# Patient Record
Sex: FEMALE | Race: WHITE | NOT HISPANIC OR LATINO | Employment: OTHER | URBAN - METROPOLITAN AREA
[De-identification: names, ages, dates, MRNs, and addresses within clinical notes are randomized per-mention and may not be internally consistent; named-entity substitution may affect disease eponyms.]

---

## 2021-10-15 ENCOUNTER — TRANSCRIBE ORDERS (OUTPATIENT)
Dept: ADMINISTRATIVE | Facility: HOSPITAL | Age: 86
End: 2021-10-15

## 2021-10-15 DIAGNOSIS — R06.09 DYSPNEA ON EXERTION: ICD-10-CM

## 2021-10-15 DIAGNOSIS — I10 HTN (HYPERTENSION) WITH GOAL TO BE DETERMINED: Primary | ICD-10-CM

## 2022-11-18 ENCOUNTER — OFFICE VISIT (OUTPATIENT)
Dept: CARDIOLOGY | Facility: CLINIC | Age: 87
End: 2022-11-18

## 2022-11-18 ENCOUNTER — LAB (OUTPATIENT)
Dept: LAB | Facility: HOSPITAL | Age: 87
End: 2022-11-18

## 2022-11-18 VITALS
WEIGHT: 154 LBS | HEART RATE: 70 BPM | DIASTOLIC BLOOD PRESSURE: 64 MMHG | BODY MASS INDEX: 25.66 KG/M2 | SYSTOLIC BLOOD PRESSURE: 117 MMHG | HEIGHT: 65 IN

## 2022-11-18 DIAGNOSIS — R06.09 DYSPNEA ON EXERTION: ICD-10-CM

## 2022-11-18 DIAGNOSIS — R09.89 LABILE HYPERTENSION: ICD-10-CM

## 2022-11-18 DIAGNOSIS — R03.0 ELEVATED BLOOD-PRESSURE READING, WITHOUT DIAGNOSIS OF HYPERTENSION: ICD-10-CM

## 2022-11-18 DIAGNOSIS — R06.09 DYSPNEA ON EXERTION: Primary | ICD-10-CM

## 2022-11-18 LAB
NT-PROBNP SERPL-MCNC: 310 PG/ML (ref 0–1800)
TSH SERPL DL<=0.05 MIU/L-ACNC: 3.12 UIU/ML (ref 0.27–4.2)

## 2022-11-18 PROCEDURE — 99204 OFFICE O/P NEW MOD 45 MIN: CPT | Performed by: INTERNAL MEDICINE

## 2022-11-18 PROCEDURE — 84443 ASSAY THYROID STIM HORMONE: CPT

## 2022-11-18 PROCEDURE — 36415 COLL VENOUS BLD VENIPUNCTURE: CPT

## 2022-11-18 PROCEDURE — 83880 ASSAY OF NATRIURETIC PEPTIDE: CPT

## 2022-11-18 RX ORDER — AMLODIPINE BESYLATE 5 MG/1
2.5 TABLET ORAL DAILY
COMMUNITY
Start: 2022-10-07

## 2022-11-18 RX ORDER — EMPAGLIFLOZIN 10 MG/1
10 TABLET, FILM COATED ORAL DAILY
COMMUNITY
Start: 2022-08-18

## 2022-11-18 RX ORDER — SPIRONOLACTONE 25 MG/1
12.5 TABLET ORAL EVERY MORNING
COMMUNITY
Start: 2022-09-30

## 2022-11-18 RX ORDER — OMEPRAZOLE 20 MG/1
20 CAPSULE, DELAYED RELEASE ORAL DAILY
COMMUNITY
Start: 2022-10-07

## 2022-11-18 RX ORDER — LISINOPRIL 10 MG/1
10 TABLET ORAL DAILY
COMMUNITY
Start: 2022-10-18 | End: 2023-02-21

## 2022-11-18 RX ORDER — ASPIRIN 81 MG/1
81 TABLET ORAL DAILY
COMMUNITY

## 2022-11-18 NOTE — PROGRESS NOTES
Chief Complaint  Congestive Heart Failure    Subjective            Jeanne Dallas presents to [facility] CARDIOLOGY  History of Present Illness    86-year-old white female.  She has no significant cardiac history in the past.  She has hypertension.  She is referred due to increased shortness of breath worse with exertion over the past several months.  The patient denies chest pain or chest pressure.  She denies palpitations.  She measures her blood pressures at home and the numbers she is getting their are normal.  However her blood pressures in the office are quite elevated.  She reports she is compliant with her medications.  She denies significant fluid accumulation.  No syncopal episodes.  She does report weight loss    PMH  Past Medical History:   Diagnosis Date   • Congenital heart disease    • Hypertension          SURGICALHX  Past Surgical History:   Procedure Laterality Date   • CARDIAC CATHETERIZATION          SOC  Social History     Socioeconomic History   • Marital status:    Tobacco Use   • Smoking status: Never   • Smokeless tobacco: Never   Vaping Use   • Vaping Use: Never used   Substance and Sexual Activity   • Alcohol use: Never   • Drug use: Never   • Sexual activity: Defer         FAMHX  Family History   Problem Relation Age of Onset   • Heart attack Mother    • Heart failure Mother    • Stroke Father           ALLERGY  No Known Allergies     MEDSCURRENT    Current Outpatient Medications:   •  amLODIPine (NORVASC) 5 MG tablet, Take 2.5 mg by mouth Daily., Disp: , Rfl:   •  aspirin 81 MG EC tablet, Take 81 mg by mouth Daily., Disp: , Rfl:   •  Jardiance 10 MG tablet tablet, Take 10 mg by mouth Daily., Disp: , Rfl:   •  lisinopril (PRINIVIL,ZESTRIL) 10 MG tablet, Take 10 mg by mouth Daily., Disp: , Rfl:   •  metoprolol tartrate (LOPRESSOR) 25 MG tablet, Take 12.5 mg by mouth 2 (Two) Times a Day., Disp: , Rfl:   •  Multiple Vitamins-Minerals (PRESERVISION AREDS 2 PO),  "Take  by mouth Daily., Disp: , Rfl:   •  omeprazole (priLOSEC) 20 MG capsule, Take 20 mg by mouth Daily., Disp: , Rfl:   •  spironolactone (ALDACTONE) 25 MG tablet, Take 12.5 mg by mouth Every Morning., Disp: , Rfl:       Review of Systems   Constitutional: Positive for malaise/fatigue and weight loss.   HENT: Negative.    Eyes: Negative.    Cardiovascular: Positive for dyspnea on exertion. Negative for chest pain.   Respiratory: Positive for shortness of breath.    Endocrine: Negative.    Hematologic/Lymphatic: Negative.    Skin: Negative.    Musculoskeletal: Negative.    Gastrointestinal: Negative.    Genitourinary: Negative.    Neurological: Negative.    Psychiatric/Behavioral: Negative.         Objective     /64 Comment (BP Location): home this AM  Pulse 70   Ht 165.1 cm (65\")   Wt 69.9 kg (154 lb)   BMI 25.63 kg/m²       General Appearance:   · well developed  · well nourished  HENT:   · oropharynx moist  · lips not cyanotic  Neck:  · thyroid not enlarged  · supple  Respiratory:  · no respiratory distress  · normal breath sounds  · no rales  Cardiovascular:  · no jugular venous distention  · regular rhythm  · apical impulse normal  · S1 normal, S2 normal  · no S3, no S4   · no murmur  · no rub, no thrill  · carotid pulses normal; no bruit  · pedal pulses normal  · lower extremity edema: none    Musculoskeletal:  · no clubbing of fingers.   · normocephalic, head atraumatic  Skin:   · warm, dry  Psychiatric:  · judgement and insight appropriate  · normal mood and affect      Result Review :             Data reviewed: Primary care records reviewed, laboratory studies reviewed, creatinine normal, electrolytes normal.  Echocardiogram reviewed from April which showed ejection fraction 60% with grade 1 diastolic dysfunction and trivial mitral regurgitation EKG reviewed from primary care office showing sinus bradycardia with no acute ST changes.    Procedures               Assessment and Plan  "       ASSESSMENT:  Encounter Diagnoses   Name Primary?   • Dyspnea on exertion Yes   • Labile hypertension    • Elevated blood-pressure reading, without diagnosis of hypertension          PLAN:    1.  Dyspnea on exertion, likely secondary to labile hypertension.  The patient's blood pressure is markedly uncontrolled in the office today initially 190/90, but her blood pressure at home this morning was 117/64.  I am ordering an ambulatory 24-hour blood pressure monitor to get some correlation with her average blood pressure.  Additionally a BNP and TSH will be checked in a 48-hour Holter monitor.  She had a relatively recent echo that was benign.  2.  Labile hypertension, with marked differences between home and office readings.  The 24-hour blood pressure monitor should help sort this out.  3.  We will discuss diagnostic results when available          Patient was given instructions and counseling regarding her condition or for health maintenance advice. Please see specific information pulled into the AVS if appropriate.             IFTIKHAR Yee MD  11/18/2022    10:51 EST

## 2022-11-22 ENCOUNTER — TELEPHONE (OUTPATIENT)
Dept: CARDIOLOGY | Facility: CLINIC | Age: 87
End: 2022-11-22

## 2022-11-22 NOTE — TELEPHONE ENCOUNTER
----- Message from BRYANT Godinez sent at 11/22/2022  9:15 AM EST -----  Labs reviewed, within normal limits.  We will call with Holter monitor results once available.

## 2022-11-30 ENCOUNTER — TELEPHONE (OUTPATIENT)
Dept: CARDIOLOGY | Facility: CLINIC | Age: 87
End: 2022-11-30

## 2022-11-30 NOTE — TELEPHONE ENCOUNTER
----- Message from Page Montague RN sent at 11/30/2022  8:51 AM EST -----    ----- Message -----  From: IFTIKHAR Yee MD  Sent: 11/30/2022   8:46 AM EST  To: Page Montague RN    Heart monitor showed predominantly normal rhythm with occasional single irregular beats.  These were not causing symptoms.  No significant arrhythmias were observed    await ambulatory BP monitor

## 2022-12-22 ENCOUNTER — APPOINTMENT (OUTPATIENT)
Dept: CARDIOLOGY | Facility: HOSPITAL | Age: 87
End: 2022-12-22

## 2023-01-09 ENCOUNTER — OFFICE VISIT (OUTPATIENT)
Dept: PULMONOLOGY | Facility: CLINIC | Age: 88
End: 2023-01-09
Payer: MEDICARE

## 2023-01-09 VITALS
HEART RATE: 63 BPM | OXYGEN SATURATION: 92 % | DIASTOLIC BLOOD PRESSURE: 82 MMHG | RESPIRATION RATE: 18 BRPM | SYSTOLIC BLOOD PRESSURE: 196 MMHG | TEMPERATURE: 97.5 F | WEIGHT: 154 LBS | BODY MASS INDEX: 24.75 KG/M2 | HEIGHT: 66 IN

## 2023-01-09 DIAGNOSIS — R06.09 DOE (DYSPNEA ON EXERTION): Primary | ICD-10-CM

## 2023-01-09 DIAGNOSIS — I51.89 DIASTOLIC DYSFUNCTION: ICD-10-CM

## 2023-01-09 PROCEDURE — 99204 OFFICE O/P NEW MOD 45 MIN: CPT | Performed by: INTERNAL MEDICINE

## 2023-01-09 RX ORDER — ESTRADIOL 0.1 MG/G
CREAM VAGINAL
COMMUNITY
Start: 2022-11-21

## 2023-01-09 NOTE — ASSESSMENT & PLAN NOTE
Suspected the patient shortness of breath likely secondary to diastolic dysfunction    Continue ambulatory blood pressure monitoring    May need up titration of medications will defer this to cardiology

## 2023-01-09 NOTE — ASSESSMENT & PLAN NOTE
At this time I am concerned about diastolic heart failure as cause of the patient's shortness of breath especially given her very elevated blood pressures    Patient however has not had any pulmonary imaging or work-up    At this time we will refrain from starting any new medications    Obtain CT scan of the chest to assess for any underlying interstitial lung disease patient does have what appears to be arthritis he does have some basilar crackles    Obtain full pulmonary function studies

## 2023-01-09 NOTE — PROGRESS NOTES
Chief Complaint  Establish Care (New Patient ), Dyspena , Shortness of Breath, Cough (Sometimes ), and Wheezing (Sometimes )    Subjective        Jeanne Dallas presents to [facility] PULMONARY & CRITICAL CARE MEDICINE  History of Present Illness  Patient here today for evaluation of shortness of breath  Has been having worsening shortness of breath over the course the past year now  Symptoms getting worse  Does have occasional lower extremity edema  Has been seen by cardiology  Cardiology felt that her labile blood pressure was causing her shortness of breath as her blood pressures very close to 200 systolic  Patient with a blood pressure of over 190 systolic here today in the office  Has no significant cough  Has been tried on bronchodilator therapies in the past with no improvement  No significant environmental or smoke exposure at this time  Objective   Vital Signs:  BP (!) 196/82 (BP Location: Left arm, Patient Position: Sitting, Cuff Size: Adult)   Pulse 63   Temp 97.5 °F (36.4 °C) (Temporal)   Resp 18   Ht 167.6 cm (66\")   Wt 69.9 kg (154 lb)   SpO2 92% Comment: room air  BMI 24.86 kg/m²   Estimated body mass index is 24.86 kg/m² as calculated from the following:    Height as of this encounter: 167.6 cm (66\").    Weight as of this encounter: 69.9 kg (154 lb).    BMI is within normal parameters. No other follow-up for BMI required.      Physical Exam vital Signs Reviewed  General WDWN, Alert, NAD.    Chest:  good aeration, clear to auscultation bilaterally, tympanic to percussion bilaterally, no work of breathing noted  CV: RRR, no MGR, .  EXT: Does have ulnar deviation of the fingers  Neuro:  A&Ox3, CN grossly intact, no focal deficits.  Skin: No rashes or lesions noted  Result Review :      Data reviewed: Echocardiogram showing impaired relaxation          Assessment and Plan   Diagnoses and all orders for this visit:    1. MANCINI (dyspnea on exertion) (Primary)  Assessment &  Plan:  At this time I am concerned about diastolic heart failure as cause of the patient's shortness of breath especially given her very elevated blood pressures    Patient however has not had any pulmonary imaging or work-up    At this time we will refrain from starting any new medications    Obtain CT scan of the chest to assess for any underlying interstitial lung disease patient does have what appears to be arthritis he does have some basilar crackles    Obtain full pulmonary function studies    Orders:  -     CT Chest Without Contrast Diagnostic; Future  -     Full Pulmonary Function Test With Bronchodilator; Future    2. Diastolic dysfunction  Assessment & Plan:  Suspected the patient shortness of breath likely secondary to diastolic dysfunction    Continue ambulatory blood pressure monitoring    May need up titration of medications will defer this to cardiology           I spent 40 minutes caring for Jaenne on this date of service. This time includes time spent by me in the following activities:preparing for the visit, reviewing tests, obtaining and/or reviewing a separately obtained history, performing a medically appropriate examination and/or evaluation , counseling and educating the patient/family/caregiver, ordering medications, tests, or procedures, referring and communicating with other health care professionals  and Personally reviewing notes from the patient's cardiology visit, reviewing notes from her echocardiogram, reviewing notes from her primary care office visit, reviewing laboratory data showing a normal TSH and a normal NT BNP  Follow Up   Return in about 6 weeks (around 2/20/2023).  Patient was given instructions and counseling regarding her condition or for health maintenance advice. Please see specific information pulled into the AVS if appropriate.

## 2023-01-18 ENCOUNTER — HOSPITAL ENCOUNTER (OUTPATIENT)
Dept: RESPIRATORY THERAPY | Facility: HOSPITAL | Age: 88
Discharge: HOME | End: 2023-01-18
Admitting: INTERNAL MEDICINE
Payer: MEDICARE

## 2023-01-18 DIAGNOSIS — R06.09 DOE (DYSPNEA ON EXERTION): ICD-10-CM

## 2023-01-18 PROCEDURE — 94060 EVALUATION OF WHEEZING: CPT

## 2023-01-18 PROCEDURE — 94726 PLETHYSMOGRAPHY LUNG VOLUMES: CPT | Performed by: INTERNAL MEDICINE

## 2023-01-18 PROCEDURE — 94640 AIRWAY INHALATION TREATMENT: CPT

## 2023-01-18 PROCEDURE — 94726 PLETHYSMOGRAPHY LUNG VOLUMES: CPT

## 2023-01-18 PROCEDURE — 94729 DIFFUSING CAPACITY: CPT

## 2023-01-18 PROCEDURE — 94060 EVALUATION OF WHEEZING: CPT | Performed by: INTERNAL MEDICINE

## 2023-01-18 PROCEDURE — 94729 DIFFUSING CAPACITY: CPT | Performed by: INTERNAL MEDICINE

## 2023-01-18 RX ORDER — ALBUTEROL SULFATE 2.5 MG/3ML
2.5 SOLUTION RESPIRATORY (INHALATION) ONCE
Status: COMPLETED | OUTPATIENT
Start: 2023-01-18 | End: 2023-01-18

## 2023-01-18 RX ADMIN — ALBUTEROL SULFATE 2.5 MG: 2.5 SOLUTION RESPIRATORY (INHALATION) at 10:57

## 2023-02-16 ENCOUNTER — HOSPITAL ENCOUNTER (OUTPATIENT)
Dept: CARDIOLOGY | Facility: HOSPITAL | Age: 88
Discharge: HOME | End: 2023-02-16
Admitting: INTERNAL MEDICINE
Payer: MEDICARE

## 2023-02-16 DIAGNOSIS — R06.09 DYSPNEA ON EXERTION: ICD-10-CM

## 2023-02-16 DIAGNOSIS — R09.89 LABILE HYPERTENSION: ICD-10-CM

## 2023-02-16 DIAGNOSIS — R03.0 ELEVATED BLOOD-PRESSURE READING, WITHOUT DIAGNOSIS OF HYPERTENSION: ICD-10-CM

## 2023-02-16 PROCEDURE — 93790 AMBL BP MNTR W/SW I&R: CPT | Performed by: INTERNAL MEDICINE

## 2023-02-16 PROCEDURE — 93788 AMBL BP MNTR W/SW A/R: CPT

## 2023-02-21 ENCOUNTER — TELEPHONE (OUTPATIENT)
Dept: CARDIOLOGY | Facility: CLINIC | Age: 88
End: 2023-02-21
Payer: MEDICARE

## 2023-02-21 LAB
BH CV ASLEEP DIP DIA: -19.4
BH CV ASLEEP DIP SYS: -19.8
BH CV ASLEEP MEAN DIA MMHG STD DEV: 9.9
BH CV ASLEEP MEAN HR BPM STD DEV: 3
BH CV ASLEEP MEAN SYS MMHG STD DEV: 16.1
BH CV ASLEEP PERIOD BEGIN TIME: 2000
BH CV ASLEEP PERIOD BP LOAD DIA: 27
BH CV ASLEEP PERIOD BP LOAD SYS: 100
BH CV ASLEEP PERIOD END TIME: 700
BH CV ASLEEP PERIOD MEAN DIA MMHG: 75
BH CV ASLEEP PERIOD MEAN HR BPM: 64
BH CV ASLEEP PERIOD MEAN SYS MMHG: 183
BH CV ASLEEP PERIOD SAMPLES: 11
BH CV AWAKE MEAN DIA MMHG STD DEV: 8.2
BH CV AWAKE MEAN HR BPM STD DEV: 10.4
BH CV AWAKE MEAN SYS MMHG STD DEV: 15.2
BH CV AWAKE PERIOD BEGIN TIME: 700
BH CV AWAKE PERIOD BP LOAD DIA: 0
BH CV AWAKE PERIOD BP LOAD SYS: 80
BH CV AWAKE PERIOD END TIME: 2000
BH CV AWAKE PERIOD MEAN DIA MMHG: 62
BH CV AWAKE PERIOD MEAN HR BPM: 66
BH CV AWAKE PERIOD MEAN SYS MMHG: 153
BH CV AWAKE PERIOD SAMPLES: 25
BH CV MAXIMUM MEAN DIA MMHG: 84
BH CV MAXIMUM MEAN HR BPM: 77
BH CV MAXIMUM MEAN SYS MMHG: 177
BH CV MEAN MEAN DIA MMHG: 81
BH CV MEAN MEAN HR BPM: 77
BH CV MEAN MEAN SYS MMHG: 166
BH CV OVERALL BEGIN TIME: 1257
BH CV OVERALL BP LOAD DIA: 8
BH CV OVERALL BP LOAD SYS: 86
BH CV OVERALL END TIME: 1405
BH CV OVERALL MEAN DIA MMHG STD DEV: 10.3
BH CV OVERALL MEAN DIA MMHG: 66
BH CV OVERALL MEAN HR BPM: 65
BH CV OVERALL MEAN HR STD DEV: 8.8
BH CV OVERALL MEAN SYS MMHG STD DEV: 20.8
BH CV OVERALL MEAN SYS MMHG: 162
BH CV OVERALL SAMPLES: 36
BH CV WHITE COAT PERIOD BP LOAD DIA: 0
BH CV WHITE COAT PERIOD BP LOAD SYS: 100
MAXIMAL PREDICTED HEART RATE: 133 BPM
STRESS TARGET HR: 113 BPM

## 2023-02-21 RX ORDER — LISINOPRIL 10 MG/1
10 TABLET ORAL 2 TIMES DAILY
Qty: 180 TABLET | Refills: 3 | Status: SHIPPED | OUTPATIENT
Start: 2023-02-21

## 2023-02-21 NOTE — TELEPHONE ENCOUNTER
----- Message from IFTIKHAR Yee MD sent at 2/21/2023 12:25 PM EST -----  24-hour blood pressure monitor reviewed, her average blood pressure is too high, there were no low readings.  Average blood pressure was 162/66.  I am increasing her lisinopril to be taken twice a day 10 mg instead of once a day.  Follow-up with Evelyn in the [place] office in 4 months

## 2023-03-01 ENCOUNTER — OFFICE VISIT (OUTPATIENT)
Dept: PULMONOLOGY | Facility: CLINIC | Age: 88
End: 2023-03-01
Payer: MEDICARE

## 2023-03-01 VITALS
TEMPERATURE: 97.7 F | OXYGEN SATURATION: 95 % | BODY MASS INDEX: 22.29 KG/M2 | RESPIRATION RATE: 18 BRPM | SYSTOLIC BLOOD PRESSURE: 185 MMHG | HEART RATE: 61 BPM | WEIGHT: 142 LBS | HEIGHT: 67 IN | DIASTOLIC BLOOD PRESSURE: 81 MMHG

## 2023-03-01 DIAGNOSIS — R09.89 LABILE HYPERTENSION: ICD-10-CM

## 2023-03-01 DIAGNOSIS — R06.09 DYSPNEA ON EXERTION: ICD-10-CM

## 2023-03-01 DIAGNOSIS — I51.89 DIASTOLIC DYSFUNCTION: ICD-10-CM

## 2023-03-01 DIAGNOSIS — J44.9 ASTHMA-COPD OVERLAP SYNDROME: Primary | ICD-10-CM

## 2023-03-01 PROCEDURE — 99214 OFFICE O/P EST MOD 30 MIN: CPT | Performed by: NURSE PRACTITIONER

## 2023-03-01 RX ORDER — ALBUTEROL SULFATE 1.25 MG/3ML
1 SOLUTION RESPIRATORY (INHALATION) EVERY 6 HOURS PRN
COMMUNITY

## 2023-03-01 RX ORDER — FLUTICASONE PROPIONATE AND SALMETEROL XINAFOATE 115; 21 UG/1; UG/1
2 AEROSOL, METERED RESPIRATORY (INHALATION)
Qty: 1 EACH | Refills: 3 | Status: SHIPPED | OUTPATIENT
Start: 2023-03-01

## 2023-03-01 RX ORDER — ALBUTEROL SULFATE 90 UG/1
2 AEROSOL, METERED RESPIRATORY (INHALATION) EVERY 4 HOURS PRN
Qty: 18 G | Refills: 3 | Status: SHIPPED | OUTPATIENT
Start: 2023-03-01

## 2023-03-01 NOTE — PATIENT INSTRUCTIONS
COPD and Physical Activity  Chronic obstructive pulmonary disease (COPD) is a long-term, or chronic, condition that affects the lungs. COPD is a general term that can be used to describe many problems that cause inflammation of the lungs and limit airflow. These conditions include chronic bronchitis and emphysema.  The main symptom of COPD is shortness of breath, which makes it harder to do even simple tasks. This can also make it harder to exercise and stay active. Talk with your health care provider about treatments to help you breathe better and actions you can take to prevent breathing problems during physical activity.  What are the benefits of exercising when you have COPD?  Exercising regularly is an important part of a healthy lifestyle. You can still exercise and do physical activities even though you have COPD. Exercise and physical activity improve your shortness of breath by increasing blood flow (circulation). This causes your heart to pump more oxygen through your body. Moderate exercise can:  Improve oxygen use.  Increase your energy level.  Help with shortness of breath.  Strengthen your breathing muscles.  Improve heart health.  Help with sleep.  Improve your self-esteem and feelings of self-worth.  Lower depression, stress, and anxiety.  Exercise can benefit everyone with COPD. The severity of your disease may affect how hard you can exercise, especially at first, but everyone can benefit. Talk with your health care provider about how much exercise is safe for you, and which activities and exercises are safe for you.  What actions can I take to prevent breathing problems during physical activity?  Sign up for a pulmonary rehabilitation program. This type of program may include:  Education about lung diseases.  Exercise classes that teach you how to exercise and be more active while improving your breathing. This usually involves:  Exercise using your lower extremities, such as a stationary  bicycle.  About 30 minutes of exercise, 2 to 5 times per week, for 6 to 12 weeks.  Strength training, such as push-ups or leg lifts.  Nutrition education.  Group classes in which you can talk with others who also have COPD and learn ways to manage stress.  If you use an oxygen tank, you should use it while you exercise. Work with your health care provider to adjust your oxygen for your physical activity. Your resting flow rate is different from your flow rate during physical activity.  How to manage your breathing while exercising  While you are exercising:  Take slow breaths.  Pace yourself, and do nottry to go too fast.  Purse your lips while breathing out. Pursing your lips is similar to a kissing or whistling position.  If doing exercise that uses a quick burst of effort, such as weight lifting:  Breathe in before starting the exercise.  Breathe out during the hardest part of the exercise, such as raising the weights.  Where to find support  You can find support for exercising with COPD from:  Your health care provider.  A pulmonary rehabilitation program.  Your local health department or community health programs.  Support groups, either online or in-person. Your health care provider may be able to recommend support groups.  Where to find more information  You can find more information about exercising with COPD from:  American Lung Association: lung.org  COPD Foundation: copdfoundation.org  Contact a health care provider if:  Your symptoms get worse.  You have nausea.  You have a fever.  You want to start a new exercise program or a new activity.  Get help right away if:  You have chest pain.  You cannot breathe.  These symptoms may represent a serious problem that is an emergency. Do not wait to see if the symptoms will go away. Get medical help right away. Call your local emergency services (911 in the [place] Do not drive yourself to the hospital.  Summary  COPD is a general term that can be used to describe  many different lung problems that cause lung inflammation and limit airflow. This includes chronic bronchitis and emphysema.  Exercise and physical activity improve your shortness of breath by increasing blood flow (circulation). This causes your heart to provide more oxygen to your body.  Contact your health care provider before starting any exercise program or new activity. Ask your health care provider what exercises and activities are safe for you.  This information is not intended to replace advice given to you by your health care provider. Make sure you discuss any questions you have with your health care provider.  Document Revised: 10/26/2021 Document Reviewed: 10/26/2021  [employer] Patient Education © 2022 [employer]

## 2023-03-03 RX ORDER — ALBUTEROL SULFATE 90 UG/1
108 INHALANT RESPIRATORY (INHALATION) AS NEEDED
Qty: 1 EACH | Refills: 0 | COMMUNITY
Start: 2023-03-03 | End: 2023-03-04

## 2023-04-10 ENCOUNTER — HOSPITAL ENCOUNTER (OUTPATIENT)
Dept: CT IMAGING | Facility: HOSPITAL | Age: 88
Discharge: HOME | End: 2023-04-10
Admitting: INTERNAL MEDICINE
Payer: MEDICARE

## 2023-04-10 DIAGNOSIS — R06.09 DOE (DYSPNEA ON EXERTION): ICD-10-CM

## 2023-04-10 PROCEDURE — 71250 CT THORAX DX C-: CPT
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
## 2023-04-19 ENCOUNTER — OFFICE VISIT (OUTPATIENT)
Dept: PULMONOLOGY | Facility: CLINIC | Age: 88
End: 2023-04-19
Payer: MEDICARE

## 2023-04-19 VITALS
DIASTOLIC BLOOD PRESSURE: 73 MMHG | TEMPERATURE: 96.2 F | SYSTOLIC BLOOD PRESSURE: 153 MMHG | HEART RATE: 63 BPM | BODY MASS INDEX: 23.04 KG/M2 | WEIGHT: 148 LBS | RESPIRATION RATE: 16 BRPM | OXYGEN SATURATION: 95 %

## 2023-04-19 DIAGNOSIS — J44.9 ASTHMA-COPD OVERLAP SYNDROME: ICD-10-CM

## 2023-04-19 DIAGNOSIS — E04.1 THYROID NODULE: ICD-10-CM

## 2023-04-19 DIAGNOSIS — R93.89 ABNORMAL CT SCAN, CHEST: ICD-10-CM

## 2023-04-19 DIAGNOSIS — I51.89 DIASTOLIC DYSFUNCTION: Primary | ICD-10-CM

## 2023-04-19 DIAGNOSIS — R06.09 CHRONIC DYSPNEA: ICD-10-CM

## 2023-04-19 PROBLEM — J44.89 ASTHMA-COPD OVERLAP SYNDROME: Status: ACTIVE | Noted: 2023-04-19

## 2023-04-19 RX ORDER — ALBUTEROL SULFATE 90 UG/1
2 AEROSOL, METERED RESPIRATORY (INHALATION) EVERY 4 HOURS PRN
Qty: 18 G | Refills: 11 | Status: SHIPPED | OUTPATIENT
Start: 2023-04-19

## 2023-04-19 RX ORDER — FLUTICASONE PROPIONATE AND SALMETEROL 250; 50 UG/1; UG/1
1 POWDER RESPIRATORY (INHALATION)
Qty: 180 EACH | Refills: 3 | Status: SHIPPED | OUTPATIENT
Start: 2023-04-19

## 2023-04-19 RX ORDER — ALBUTEROL SULFATE 1.25 MG/3ML
1 SOLUTION RESPIRATORY (INHALATION) EVERY 6 HOURS PRN
Qty: 180 EACH | Refills: 11 | Status: SHIPPED | OUTPATIENT
Start: 2023-04-19 | End: 2023-05-19

## 2023-05-17 ENCOUNTER — TRANSCRIBE ORDERS (OUTPATIENT)
Dept: PHYSICAL THERAPY | Facility: CLINIC | Age: 88
End: 2023-05-17
Payer: MEDICARE

## 2023-05-17 DIAGNOSIS — N39.46 URINARY INCONTINENCE, MIXED: Primary | ICD-10-CM

## 2023-10-05 ENCOUNTER — APPOINTMENT (OUTPATIENT)
Dept: GENERAL RADIOLOGY | Facility: HOSPITAL | Age: 88
DRG: 956 | End: 2023-10-05
Payer: MEDICARE

## 2023-10-05 ENCOUNTER — ANESTHESIA EVENT (OUTPATIENT)
Dept: PERIOP | Facility: HOSPITAL | Age: 88
DRG: 956 | End: 2023-10-05
Payer: MEDICARE

## 2023-10-05 ENCOUNTER — HOSPITAL ENCOUNTER (INPATIENT)
Facility: HOSPITAL | Age: 88
LOS: 1 days | Discharge: NURSING, REHAB OR LONG-TERM CARE | DRG: 956 | End: 2023-10-06
Attending: FAMILY MEDICINE | Admitting: FAMILY MEDICINE
Payer: MEDICARE

## 2023-10-05 ENCOUNTER — ANESTHESIA (OUTPATIENT)
Dept: PERIOP | Facility: HOSPITAL | Age: 88
DRG: 956 | End: 2023-10-05
Payer: MEDICARE

## 2023-10-05 DIAGNOSIS — R26.2 DIFFICULTY IN WALKING: ICD-10-CM

## 2023-10-05 DIAGNOSIS — S72.001A CLOSED FRACTURE OF RIGHT HIP, INITIAL ENCOUNTER: Primary | ICD-10-CM

## 2023-10-05 DIAGNOSIS — Z78.9 DECREASED ACTIVITIES OF DAILY LIVING (ADL): ICD-10-CM

## 2023-10-05 LAB
ALBUMIN SERPL-MCNC: 4.3 G/DL (ref 3.5–5.2)
ALBUMIN/GLOB SERPL: 1.8 G/DL
ALP SERPL-CCNC: 63 U/L (ref 39–117)
ALT SERPL W P-5'-P-CCNC: 12 U/L (ref 1–33)
ANION GAP SERPL CALCULATED.3IONS-SCNC: 12.3 MMOL/L (ref 5–15)
APTT PPP: 27.3 SECONDS (ref 24.2–34.2)
AST SERPL-CCNC: 17 U/L (ref 1–32)
BILIRUB SERPL-MCNC: 0.7 MG/DL (ref 0–1.2)
BUN SERPL-MCNC: 19 MG/DL (ref 8–23)
BUN/CREAT SERPL: 27.9 (ref 7–25)
CALCIUM SPEC-SCNC: 9.4 MG/DL (ref 8.6–10.5)
CHLORIDE SERPL-SCNC: 105 MMOL/L (ref 98–107)
CO2 SERPL-SCNC: 25.7 MMOL/L (ref 22–29)
CREAT SERPL-MCNC: 0.68 MG/DL (ref 0.57–1)
DEPRECATED RDW RBC AUTO: 47.4 FL (ref 37–54)
EGFRCR SERPLBLD CKD-EPI 2021: 84.4 ML/MIN/1.73
ELLIPTOCYTES BLD QL SMEAR: ABNORMAL
ERYTHROCYTE [DISTWIDTH] IN BLOOD BY AUTOMATED COUNT: 14.2 % (ref 12.3–15.4)
GLOBULIN UR ELPH-MCNC: 2.4 GM/DL
GLUCOSE SERPL-MCNC: 147 MG/DL (ref 65–99)
HCT VFR BLD AUTO: 37.8 % (ref 34–46.6)
HGB BLD-MCNC: 12.1 G/DL (ref 12–15.9)
INR PPP: 1.11 (ref 0.86–1.15)
LYMPHOCYTES # BLD MANUAL: 0.59 10*3/MM3 (ref 0.7–3.1)
LYMPHOCYTES NFR BLD MANUAL: 12 % (ref 5–12)
MAGNESIUM SERPL-MCNC: 2.1 MG/DL (ref 1.6–2.4)
MCH RBC QN AUTO: 28.9 PG (ref 26.6–33)
MCHC RBC AUTO-ENTMCNC: 32 G/DL (ref 31.5–35.7)
MCV RBC AUTO: 90.4 FL (ref 79–97)
MICROCYTES BLD QL: ABNORMAL
MONOCYTES # BLD: 1.78 10*3/MM3 (ref 0.1–0.9)
NEUTROPHILS # BLD AUTO: 12.47 10*3/MM3 (ref 1.7–7)
NEUTROPHILS NFR BLD MANUAL: 84 % (ref 42.7–76)
OVALOCYTES BLD QL SMEAR: ABNORMAL
PLAT MORPH BLD: NORMAL
PLATELET # BLD AUTO: 116 10*3/MM3 (ref 140–450)
PMV BLD AUTO: 12.9 FL (ref 6–12)
POTASSIUM SERPL-SCNC: 4 MMOL/L (ref 3.5–5.2)
PROT SERPL-MCNC: 6.7 G/DL (ref 6–8.5)
PROTHROMBIN TIME: 14.4 SECONDS (ref 11.8–14.9)
RBC # BLD AUTO: 4.18 10*6/MM3 (ref 3.77–5.28)
SODIUM SERPL-SCNC: 143 MMOL/L (ref 136–145)
VARIANT LYMPHS NFR BLD MANUAL: 4 % (ref 19.6–45.3)
WBC MORPH BLD: NORMAL
WBC NRBC COR # BLD: 14.85 10*3/MM3 (ref 3.4–10.8)

## 2023-10-05 PROCEDURE — 25810000003 LACTATED RINGERS PER 1000 ML: Performed by: ANESTHESIOLOGY

## 2023-10-05 PROCEDURE — C1713 ANCHOR/SCREW BN/BN,TIS/BN: HCPCS | Performed by: ORTHOPAEDIC SURGERY

## 2023-10-05 PROCEDURE — 25010000002 ROPIVACAINE PER 1 MG: Performed by: ORTHOPAEDIC SURGERY

## 2023-10-05 PROCEDURE — 25010000002 MIDAZOLAM PER 1MG

## 2023-10-05 PROCEDURE — 25010000002 CEFAZOLIN PER 500 MG: Performed by: NURSE ANESTHETIST, CERTIFIED REGISTERED

## 2023-10-05 PROCEDURE — 85610 PROTHROMBIN TIME: CPT | Performed by: PHYSICIAN ASSISTANT

## 2023-10-05 PROCEDURE — 25010000002 ONDANSETRON PER 1 MG: Performed by: PHYSICIAN ASSISTANT

## 2023-10-05 PROCEDURE — 0SRR0JA REPLACEMENT OF RIGHT HIP JOINT, FEMORAL SURFACE WITH SYNTHETIC SUBSTITUTE, UNCEMENTED, OPEN APPROACH: ICD-10-PCS | Performed by: ORTHOPAEDIC SURGERY

## 2023-10-05 PROCEDURE — 25010000002 FENTANYL CITRATE (PF) 50 MCG/ML SOLUTION: Performed by: NURSE ANESTHETIST, CERTIFIED REGISTERED

## 2023-10-05 PROCEDURE — 73502 X-RAY EXAM HIP UNI 2-3 VIEWS: CPT

## 2023-10-05 PROCEDURE — 85025 COMPLETE CBC W/AUTO DIFF WBC: CPT | Performed by: PHYSICIAN ASSISTANT

## 2023-10-05 PROCEDURE — 25010000002 HYDROMORPHONE 1 MG/ML SOLUTION: Performed by: INTERNAL MEDICINE

## 2023-10-05 PROCEDURE — 25010000002 CEFAZOLIN IN DEXTROSE 2-4 GM/100ML-% SOLUTION: Performed by: ORTHOPAEDIC SURGERY

## 2023-10-05 PROCEDURE — 76000 FLUOROSCOPY <1 HR PHYS/QHP: CPT

## 2023-10-05 PROCEDURE — 83735 ASSAY OF MAGNESIUM: CPT | Performed by: PHYSICIAN ASSISTANT

## 2023-10-05 PROCEDURE — 25010000002 MIDAZOLAM PER 1MG: Performed by: ANESTHESIOLOGY

## 2023-10-05 PROCEDURE — 80053 COMPREHEN METABOLIC PANEL: CPT | Performed by: PHYSICIAN ASSISTANT

## 2023-10-05 PROCEDURE — 27236 TREAT THIGH FRACTURE: CPT | Performed by: PHYSICIAN ASSISTANT

## 2023-10-05 PROCEDURE — 94799 UNLISTED PULMONARY SVC/PX: CPT

## 2023-10-05 PROCEDURE — C1776 JOINT DEVICE (IMPLANTABLE): HCPCS | Performed by: ORTHOPAEDIC SURGERY

## 2023-10-05 PROCEDURE — 93005 ELECTROCARDIOGRAM TRACING: CPT | Performed by: PHYSICIAN ASSISTANT

## 2023-10-05 PROCEDURE — 25810000003 LACTATED RINGERS PER 1000 ML: Performed by: ORTHOPAEDIC SURGERY

## 2023-10-05 PROCEDURE — 25010000002 PROPOFOL 10 MG/ML EMULSION: Performed by: NURSE ANESTHETIST, CERTIFIED REGISTERED

## 2023-10-05 PROCEDURE — 85730 THROMBOPLASTIN TIME PARTIAL: CPT | Performed by: PHYSICIAN ASSISTANT

## 2023-10-05 PROCEDURE — 99222 1ST HOSP IP/OBS MODERATE 55: CPT | Performed by: ORTHOPAEDIC SURGERY

## 2023-10-05 PROCEDURE — 25010000002 DEXAMETHASONE PER 1 MG: Performed by: NURSE ANESTHETIST, CERTIFIED REGISTERED

## 2023-10-05 PROCEDURE — 25010000002 MORPHINE PER 10 MG: Performed by: PHYSICIAN ASSISTANT

## 2023-10-05 PROCEDURE — 25010000002 ONDANSETRON PER 1 MG: Performed by: NURSE ANESTHETIST, CERTIFIED REGISTERED

## 2023-10-05 PROCEDURE — 85007 BL SMEAR W/DIFF WBC COUNT: CPT | Performed by: PHYSICIAN ASSISTANT

## 2023-10-05 PROCEDURE — 25010000002 EPINEPHRINE 1 MG/ML SOLUTION: Performed by: ORTHOPAEDIC SURGERY

## 2023-10-05 PROCEDURE — 27236 TREAT THIGH FRACTURE: CPT | Performed by: ORTHOPAEDIC SURGERY

## 2023-10-05 PROCEDURE — 25010000002 SUGAMMADEX 200 MG/2ML SOLUTION: Performed by: NURSE ANESTHETIST, CERTIFIED REGISTERED

## 2023-10-05 PROCEDURE — 25010000002 MORPHINE PER 10 MG: Performed by: ORTHOPAEDIC SURGERY

## 2023-10-05 PROCEDURE — 25010000002 KETOROLAC TROMETHAMINE PER 15 MG: Performed by: ORTHOPAEDIC SURGERY

## 2023-10-05 PROCEDURE — 94761 N-INVAS EAR/PLS OXIMETRY MLT: CPT

## 2023-10-05 DEVICE — CAP PRT HIP BIPOL: Type: IMPLANTABLE DEVICE | Site: HIP | Status: FUNCTIONAL

## 2023-10-05 DEVICE — KT PREP/CMT FEM QUICKUSE 2BONEPLUG PE: Type: IMPLANTABLE DEVICE | Site: ACETABULUM | Status: FUNCTIONAL

## 2023-10-05 DEVICE — SUT NONABS MAXBRAID NMBR5 K60 38IN WHT/BLU: Type: IMPLANTABLE DEVICE | Site: HIP | Status: FUNCTIONAL

## 2023-10-05 DEVICE — HD FEM/HIP UNIV COCR 12/14TPR 28MM MIN3.5: Type: IMPLANTABLE DEVICE | Site: HIP | Status: FUNCTIONAL

## 2023-10-05 DEVICE — CUP ACET RINGLOC BIPOL 28MM 46MM: Type: IMPLANTABLE DEVICE | Site: HIP | Status: FUNCTIONAL

## 2023-10-05 DEVICE — AVENIR® STANDARD STEM CEMENTED 5
Type: IMPLANTABLE DEVICE | Site: HIP | Status: FUNCTIONAL
Brand: AVENIR®

## 2023-10-05 DEVICE — CMT BONE PALACOS R HI/VISC 1X40: Type: IMPLANTABLE DEVICE | Site: HIP | Status: FUNCTIONAL

## 2023-10-05 RX ORDER — PROMETHAZINE HYDROCHLORIDE 12.5 MG/1
25 TABLET ORAL ONCE AS NEEDED
Status: DISCONTINUED | OUTPATIENT
Start: 2023-10-05 | End: 2023-10-05 | Stop reason: HOSPADM

## 2023-10-05 RX ORDER — PROMETHAZINE HYDROCHLORIDE 25 MG/1
25 SUPPOSITORY RECTAL ONCE AS NEEDED
Status: DISCONTINUED | OUTPATIENT
Start: 2023-10-05 | End: 2023-10-05 | Stop reason: HOSPADM

## 2023-10-05 RX ORDER — HYDROCODONE BITARTRATE AND ACETAMINOPHEN 5; 325 MG/1; MG/1
2 TABLET ORAL EVERY 4 HOURS PRN
Status: DISCONTINUED | OUTPATIENT
Start: 2023-10-05 | End: 2023-10-06 | Stop reason: HOSPADM

## 2023-10-05 RX ORDER — HYDROCODONE BITARTRATE AND ACETAMINOPHEN 5; 325 MG/1; MG/1
1 TABLET ORAL EVERY 6 HOURS PRN
Status: DISCONTINUED | OUTPATIENT
Start: 2023-10-05 | End: 2023-10-05

## 2023-10-05 RX ORDER — MIDAZOLAM HYDROCHLORIDE 2 MG/2ML
1 INJECTION, SOLUTION INTRAMUSCULAR; INTRAVENOUS ONCE
Status: COMPLETED | OUTPATIENT
Start: 2023-10-05 | End: 2023-10-05

## 2023-10-05 RX ORDER — CELECOXIB 100 MG/1
200 CAPSULE ORAL ONCE
Status: COMPLETED | OUTPATIENT
Start: 2023-10-05 | End: 2023-10-05

## 2023-10-05 RX ORDER — ONDANSETRON 2 MG/ML
4 INJECTION INTRAMUSCULAR; INTRAVENOUS EVERY 4 HOURS PRN
Status: DISCONTINUED | OUTPATIENT
Start: 2023-10-05 | End: 2023-10-06 | Stop reason: HOSPADM

## 2023-10-05 RX ORDER — SODIUM CHLORIDE 9 MG/ML
40 INJECTION, SOLUTION INTRAVENOUS AS NEEDED
Status: DISCONTINUED | OUTPATIENT
Start: 2023-10-05 | End: 2023-10-06 | Stop reason: HOSPADM

## 2023-10-05 RX ORDER — DEXAMETHASONE SODIUM PHOSPHATE 4 MG/ML
INJECTION, SOLUTION INTRA-ARTICULAR; INTRALESIONAL; INTRAMUSCULAR; INTRAVENOUS; SOFT TISSUE AS NEEDED
Status: DISCONTINUED | OUTPATIENT
Start: 2023-10-05 | End: 2023-10-05 | Stop reason: SURG

## 2023-10-05 RX ORDER — ACETAMINOPHEN 325 MG/1
325 TABLET ORAL EVERY 4 HOURS PRN
Status: DISCONTINUED | OUTPATIENT
Start: 2023-10-05 | End: 2023-10-06 | Stop reason: HOSPADM

## 2023-10-05 RX ORDER — SODIUM CHLORIDE 0.9 % (FLUSH) 0.9 %
3 SYRINGE (ML) INJECTION EVERY 12 HOURS SCHEDULED
Status: DISCONTINUED | OUTPATIENT
Start: 2023-10-06 | End: 2023-10-06 | Stop reason: HOSPADM

## 2023-10-05 RX ORDER — FENTANYL CITRATE 50 UG/ML
INJECTION, SOLUTION INTRAMUSCULAR; INTRAVENOUS AS NEEDED
Status: DISCONTINUED | OUTPATIENT
Start: 2023-10-05 | End: 2023-10-05 | Stop reason: SURG

## 2023-10-05 RX ORDER — HYDROCODONE BITARTRATE AND ACETAMINOPHEN 5; 325 MG/1; MG/1
1 TABLET ORAL EVERY 4 HOURS PRN
Status: DISCONTINUED | OUTPATIENT
Start: 2023-10-05 | End: 2023-10-06 | Stop reason: HOSPADM

## 2023-10-05 RX ORDER — SODIUM CHLORIDE 0.9 % (FLUSH) 0.9 %
10 SYRINGE (ML) INJECTION AS NEEDED
Status: DISCONTINUED | OUTPATIENT
Start: 2023-10-05 | End: 2023-10-05 | Stop reason: HOSPADM

## 2023-10-05 RX ORDER — PROPOFOL 10 MG/ML
VIAL (ML) INTRAVENOUS AS NEEDED
Status: DISCONTINUED | OUTPATIENT
Start: 2023-10-05 | End: 2023-10-05 | Stop reason: SURG

## 2023-10-05 RX ORDER — SODIUM CHLORIDE, SODIUM LACTATE, POTASSIUM CHLORIDE, CALCIUM CHLORIDE 600; 310; 30; 20 MG/100ML; MG/100ML; MG/100ML; MG/100ML
100 INJECTION, SOLUTION INTRAVENOUS CONTINUOUS
Status: DISCONTINUED | OUTPATIENT
Start: 2023-10-06 | End: 2023-10-06 | Stop reason: HOSPADM

## 2023-10-05 RX ORDER — DOCUSATE SODIUM 100 MG/1
100 CAPSULE, LIQUID FILLED ORAL 2 TIMES DAILY PRN
Status: DISCONTINUED | OUTPATIENT
Start: 2023-10-05 | End: 2023-10-06 | Stop reason: HOSPADM

## 2023-10-05 RX ORDER — MORPHINE SULFATE 2 MG/ML
2 INJECTION, SOLUTION INTRAMUSCULAR; INTRAVENOUS
Status: DISCONTINUED | OUTPATIENT
Start: 2023-10-05 | End: 2023-10-05

## 2023-10-05 RX ORDER — IPRATROPIUM BROMIDE AND ALBUTEROL SULFATE 2.5; .5 MG/3ML; MG/3ML
3 SOLUTION RESPIRATORY (INHALATION) ONCE
Status: COMPLETED | OUTPATIENT
Start: 2023-10-05 | End: 2023-10-05

## 2023-10-05 RX ORDER — CEFAZOLIN SODIUM 1 G/3ML
INJECTION, POWDER, FOR SOLUTION INTRAMUSCULAR; INTRAVENOUS AS NEEDED
Status: DISCONTINUED | OUTPATIENT
Start: 2023-10-05 | End: 2023-10-05 | Stop reason: SURG

## 2023-10-05 RX ORDER — ONDANSETRON 2 MG/ML
INJECTION INTRAMUSCULAR; INTRAVENOUS AS NEEDED
Status: DISCONTINUED | OUTPATIENT
Start: 2023-10-05 | End: 2023-10-05 | Stop reason: SURG

## 2023-10-05 RX ORDER — PHENYLEPHRINE HCL IN 0.9% NACL 1 MG/10 ML
SYRINGE (ML) INTRAVENOUS AS NEEDED
Status: DISCONTINUED | OUTPATIENT
Start: 2023-10-05 | End: 2023-10-05 | Stop reason: SURG

## 2023-10-05 RX ORDER — MEPERIDINE HYDROCHLORIDE 25 MG/ML
12.5 INJECTION INTRAMUSCULAR; INTRAVENOUS; SUBCUTANEOUS
Status: DISCONTINUED | OUTPATIENT
Start: 2023-10-05 | End: 2023-10-05 | Stop reason: HOSPADM

## 2023-10-05 RX ORDER — ROCURONIUM BROMIDE 10 MG/ML
INJECTION, SOLUTION INTRAVENOUS AS NEEDED
Status: DISCONTINUED | OUTPATIENT
Start: 2023-10-05 | End: 2023-10-05 | Stop reason: SURG

## 2023-10-05 RX ORDER — SODIUM CHLORIDE 0.9 % (FLUSH) 0.9 %
10 SYRINGE (ML) INJECTION EVERY 12 HOURS SCHEDULED
Status: DISCONTINUED | OUTPATIENT
Start: 2023-10-05 | End: 2023-10-06 | Stop reason: HOSPADM

## 2023-10-05 RX ORDER — OXYCODONE HYDROCHLORIDE 5 MG/1
5 TABLET ORAL
Status: DISCONTINUED | OUTPATIENT
Start: 2023-10-05 | End: 2023-10-05 | Stop reason: HOSPADM

## 2023-10-05 RX ORDER — SCOLOPAMINE TRANSDERMAL SYSTEM 1 MG/1
1 PATCH, EXTENDED RELEASE TRANSDERMAL
Status: DISCONTINUED | OUTPATIENT
Start: 2023-10-05 | End: 2023-10-06 | Stop reason: HOSPADM

## 2023-10-05 RX ORDER — ACETAMINOPHEN 500 MG
1000 TABLET ORAL EVERY 8 HOURS
Status: DISCONTINUED | OUTPATIENT
Start: 2023-10-06 | End: 2023-10-06 | Stop reason: HOSPADM

## 2023-10-05 RX ORDER — SODIUM CHLORIDE 0.9 % (FLUSH) 0.9 %
10 SYRINGE (ML) INJECTION AS NEEDED
Status: DISCONTINUED | OUTPATIENT
Start: 2023-10-05 | End: 2023-10-06 | Stop reason: HOSPADM

## 2023-10-05 RX ORDER — CEFAZOLIN SODIUM 2 G/100ML
2 INJECTION, SOLUTION INTRAVENOUS EVERY 8 HOURS
Status: COMPLETED | OUTPATIENT
Start: 2023-10-06 | End: 2023-10-06

## 2023-10-05 RX ORDER — ONDANSETRON 2 MG/ML
4 INJECTION INTRAMUSCULAR; INTRAVENOUS ONCE AS NEEDED
Status: DISCONTINUED | OUTPATIENT
Start: 2023-10-05 | End: 2023-10-05 | Stop reason: HOSPADM

## 2023-10-05 RX ORDER — LIDOCAINE HYDROCHLORIDE 20 MG/ML
INJECTION, SOLUTION EPIDURAL; INFILTRATION; INTRACAUDAL; PERINEURAL AS NEEDED
Status: DISCONTINUED | OUTPATIENT
Start: 2023-10-05 | End: 2023-10-05 | Stop reason: SURG

## 2023-10-05 RX ORDER — CEFAZOLIN SODIUM 2 G/100ML
2000 INJECTION, SOLUTION INTRAVENOUS
Status: DISCONTINUED | OUTPATIENT
Start: 2023-10-05 | End: 2023-10-05 | Stop reason: HOSPADM

## 2023-10-05 RX ORDER — HYDROCODONE BITARTRATE AND ACETAMINOPHEN 10; 325 MG/1; MG/1
1 TABLET ORAL EVERY 4 HOURS PRN
Status: DISCONTINUED | OUTPATIENT
Start: 2023-10-05 | End: 2023-10-05

## 2023-10-05 RX ORDER — ACETAMINOPHEN 500 MG
1000 TABLET ORAL ONCE
Status: COMPLETED | OUTPATIENT
Start: 2023-10-05 | End: 2023-10-05

## 2023-10-05 RX ORDER — MIDAZOLAM HYDROCHLORIDE 2 MG/2ML
INJECTION, SOLUTION INTRAMUSCULAR; INTRAVENOUS
Status: COMPLETED
Start: 2023-10-05 | End: 2023-10-05

## 2023-10-05 RX ORDER — MAGNESIUM HYDROXIDE 1200 MG/15ML
LIQUID ORAL AS NEEDED
Status: DISCONTINUED | OUTPATIENT
Start: 2023-10-05 | End: 2023-10-05 | Stop reason: HOSPADM

## 2023-10-05 RX ORDER — SODIUM CHLORIDE, SODIUM LACTATE, POTASSIUM CHLORIDE, CALCIUM CHLORIDE 600; 310; 30; 20 MG/100ML; MG/100ML; MG/100ML; MG/100ML
9 INJECTION, SOLUTION INTRAVENOUS CONTINUOUS PRN
Status: DISCONTINUED | OUTPATIENT
Start: 2023-10-05 | End: 2023-10-06 | Stop reason: HOSPADM

## 2023-10-05 RX ORDER — SODIUM CHLORIDE 9 MG/ML
40 INJECTION, SOLUTION INTRAVENOUS AS NEEDED
Status: DISCONTINUED | OUTPATIENT
Start: 2023-10-05 | End: 2023-10-05 | Stop reason: HOSPADM

## 2023-10-05 RX ADMIN — MORPHINE SULFATE 2 MG: 2 INJECTION, SOLUTION INTRAMUSCULAR; INTRAVENOUS at 03:06

## 2023-10-05 RX ADMIN — SODIUM CHLORIDE, POTASSIUM CHLORIDE, SODIUM LACTATE AND CALCIUM CHLORIDE: 600; 310; 30; 20 INJECTION, SOLUTION INTRAVENOUS at 21:03

## 2023-10-05 RX ADMIN — HYDROMORPHONE HYDROCHLORIDE 1 MG: 1 INJECTION, SOLUTION INTRAMUSCULAR; INTRAVENOUS; SUBCUTANEOUS at 13:13

## 2023-10-05 RX ADMIN — MIDAZOLAM HYDROCHLORIDE 1 MG: 2 INJECTION, SOLUTION INTRAMUSCULAR; INTRAVENOUS at 18:48

## 2023-10-05 RX ADMIN — DEXAMETHASONE SODIUM PHOSPHATE 8 MG: 4 INJECTION, SOLUTION INTRAMUSCULAR; INTRAVENOUS at 20:07

## 2023-10-05 RX ADMIN — ONDANSETRON 4 MG: 2 INJECTION INTRAMUSCULAR; INTRAVENOUS at 02:08

## 2023-10-05 RX ADMIN — ONDANSETRON 4 MG: 2 INJECTION INTRAMUSCULAR; INTRAVENOUS at 09:29

## 2023-10-05 RX ADMIN — SCOPOLAMINE 1 PATCH: 1.5 PATCH, EXTENDED RELEASE TRANSDERMAL at 15:52

## 2023-10-05 RX ADMIN — Medication 200 MCG: at 20:56

## 2023-10-05 RX ADMIN — MIDAZOLAM HYDROCHLORIDE 1 MG: 1 INJECTION, SOLUTION INTRAMUSCULAR; INTRAVENOUS at 18:48

## 2023-10-05 RX ADMIN — ROCURONIUM BROMIDE 50 MG: 50 INJECTION INTRAVENOUS at 19:36

## 2023-10-05 RX ADMIN — ACETAMINOPHEN 1000 MG: 500 TABLET ORAL at 23:50

## 2023-10-05 RX ADMIN — MIDAZOLAM HYDROCHLORIDE 1 MG: 1 INJECTION, SOLUTION INTRAMUSCULAR; INTRAVENOUS at 15:51

## 2023-10-05 RX ADMIN — ACETAMINOPHEN 1000 MG: 500 TABLET ORAL at 15:52

## 2023-10-05 RX ADMIN — MORPHINE SULFATE 4 MG: 4 INJECTION, SOLUTION INTRAMUSCULAR; INTRAVENOUS at 06:11

## 2023-10-05 RX ADMIN — PROPOFOL 120 MG: 10 INJECTION, EMULSION INTRAVENOUS at 19:35

## 2023-10-05 RX ADMIN — SUGAMMADEX 200 MG: 100 INJECTION, SOLUTION INTRAVENOUS at 20:57

## 2023-10-05 RX ADMIN — Medication 100 MCG: at 19:40

## 2023-10-05 RX ADMIN — ONDANSETRON 4 MG: 2 INJECTION INTRAMUSCULAR; INTRAVENOUS at 20:07

## 2023-10-05 RX ADMIN — LIDOCAINE HYDROCHLORIDE 50 MG: 20 INJECTION, SOLUTION EPIDURAL; INFILTRATION; INTRACAUDAL; PERINEURAL at 19:35

## 2023-10-05 RX ADMIN — Medication 10 ML: at 08:54

## 2023-10-05 RX ADMIN — CEFAZOLIN SODIUM 2 G: 2 INJECTION, SOLUTION INTRAVENOUS at 23:50

## 2023-10-05 RX ADMIN — FENTANYL CITRATE 50 MCG: 50 INJECTION, SOLUTION INTRAMUSCULAR; INTRAVENOUS at 20:04

## 2023-10-05 RX ADMIN — SODIUM CHLORIDE, POTASSIUM CHLORIDE, SODIUM LACTATE AND CALCIUM CHLORIDE 100 ML/HR: 600; 310; 30; 20 INJECTION, SOLUTION INTRAVENOUS at 23:52

## 2023-10-05 RX ADMIN — HYDROMORPHONE HYDROCHLORIDE 1 MG: 1 INJECTION, SOLUTION INTRAMUSCULAR; INTRAVENOUS; SUBCUTANEOUS at 09:02

## 2023-10-05 RX ADMIN — Medication 10 ML: at 02:13

## 2023-10-05 RX ADMIN — FENTANYL CITRATE 50 MCG: 50 INJECTION, SOLUTION INTRAMUSCULAR; INTRAVENOUS at 20:07

## 2023-10-05 RX ADMIN — CEFAZOLIN SODIUM 2 G: 1 INJECTION, POWDER, FOR SOLUTION INTRAMUSCULAR; INTRAVENOUS at 19:33

## 2023-10-05 RX ADMIN — SODIUM CHLORIDE, POTASSIUM CHLORIDE, SODIUM LACTATE AND CALCIUM CHLORIDE 9 ML/HR: 600; 310; 30; 20 INJECTION, SOLUTION INTRAVENOUS at 15:52

## 2023-10-05 RX ADMIN — IPRATROPIUM BROMIDE AND ALBUTEROL SULFATE 3 ML: .5; 3 SOLUTION RESPIRATORY (INHALATION) at 21:38

## 2023-10-05 RX ADMIN — CELECOXIB 200 MG: 100 CAPSULE ORAL at 15:52

## 2023-10-05 RX ADMIN — ROCURONIUM BROMIDE 20 MG: 50 INJECTION INTRAVENOUS at 19:38

## 2023-10-05 NOTE — H&P (VIEW-ONLY)
[facility]   Consult Note    Patient Name: Jeanne Dallas  : 1935  MRN: [id number]  Primary Care Physician:  Sourav Gonzalez MD  Referring Physician: No Known Provider  Date of admission: 10/5/2023    Subjective   Subjective     Reason for Consult/ Chief Complaint: Right hip pain    HPI:  Jeanne Dallas is a 87 y.o. female who had a mechanical fall at home yesterday evening when she lost her balance.  She was taken to [facility] in [place].  X-rays revealed a displaced femoral neck fracture.  She was transferred to [facility].  She reports pain in the right hip.  She is otherwise doing well and has no recent health issues.  She was previously independently ambulatory.    Review of Systems   All systems were reviewed and negative except for those mentioned in HPI    Personal History     Past Medical History:   Diagnosis Date    Asthma     Congenital heart disease     Disease of thyroid gland     Hypertension        Past Surgical History:   Procedure Laterality Date    CARDIAC CATHETERIZATION         Family History: family history includes Heart attack in her mother; Heart failure in her mother; Stroke in her father. Otherwise pertinent FHx was reviewed and not pertinent to current issue.    Social History:  reports that she has never smoked. She has been exposed to tobacco smoke. She has never used smokeless tobacco. She reports that she does not drink alcohol and does not use drugs.    Home Medications:  Fluticasone-Salmeterol, Multiple Vitamins-Minerals, Spacer/Aero-Holding Chambers, Vibegron, albuterol sulfate HFA, amLODIPine, aspirin, empagliflozin, estradiol, lisinopril, metoprolol tartrate, omeprazole, and spironolactone    Allergies:  No Known Allergies    Objective    Objective     Vitals:   Temp:  [97.8 °F (36.6 °C)] 97.8 °F (36.6 °C)  Heart Rate:  [65-72] 72  Resp:  [14-16] 16  BP: (124-140)/(40-43) 140/43  Flow (L/min):  [2] 2    Physical  Exam:   Constitutional: Awake, alert   HENT: NCAT, mucous membranes moist   Neck: Supple   Respiratory: Unlabored breathing   Cardiovascular: Regular heart rate   Gastrointestinal: Positive bowel sounds, soft, nontender, nondistended   Musculoskeletal: Right lower extremity is shortened and externally rotated.  Positive pulses.  Tender to palpation of the hip.  Mild swelling.  Skin intact.  Negative Mehrdad.  Neurovascular intact extremity.   Psychiatric: Appropriate affect, cooperative   Neurologic: Oriented x 3, strength symmetric in all extremities, Cranial Nerves grossly intact to confrontation, speech clear   Skin: No rashes     Result Review    Result Review:  I have personally reviewed the results from the time of this admission to 10/5/2023 07:59 EDT and agree with these findings:  [x]  Laboratory  []  Microbiology  [x]  Radiology  []  EKG/Telemetry   []  Cardiology/Vascular   []  Pathology  []  Old records  []  Other:    Most notable findings include: Right femoral neck fracture    Assessment & Plan   Assessment / Plan     Brief Patient Summary:  Jeanne Dallas is a 87 y.o. female who has right femoral neck fracture    Active Hospital Problems:  Active Hospital Problems    Diagnosis     **Closed right hip fracture        Plan: I discussed treatment options with the patient.  Operative versus nonoperative treatment was discussed.  Risks and benefits of surgery were discussed and the patient wishes to proceed.  Plan for n.p.o. and surgery later today with right hip hemiarthroplasty.  Thank you for the consultation.        Electronically signed by Emmett Ambrose MD, 10/05/23, 7:59 AM EDT.

## 2023-10-05 NOTE — H&P
[facility]   HOSPITALIST HISTORY AND PHYSICAL  Date: 10/5/2023   Patient Name: Jeanne Dallas  : 1935  MRN: [id number]  Primary Care Physician:  Sourav Gonzalez MD  Date of admission: 10/5/2023    Subjective   Subjective     Chief Complaint: Right hip pain    HPI:    Jeanne Dallas is a 87 y.o. female who has a past medical history of hypertension, COPD, GERD, and diastolic CHF who presented to [facility] emergency department after sustaining a fall and having immediate right hip pain.  She typically ambulates with a cane, states that she just lost her footing and fell.  At the [facility] emergency department she is found to have a right hip fracture and a left pubic ramus fracture.  Laboratory evaluation overall unremarkable at the [facility] facility.     Orthopedics, Dr. Ambrose, was contacted and agreed to see the patient in consultation.  Because the above, the hospitalist team was contacted to admit the patient for further management of her right hip fracture and left pubic ramus fracture.        Personal History     Past Medical History:  Past Medical History:   Diagnosis Date    Asthma     Congenital heart disease     Disease of thyroid gland     Hypertension        Past Surgical History:  Past Surgical History:   Procedure Laterality Date    CARDIAC CATHETERIZATION         Family History:   Family History   Problem Relation Age of Onset    Heart attack Mother     Heart failure Mother     Stroke Father        Social History:    reports that she has never smoked. She has been exposed to tobacco smoke. She has never used smokeless tobacco. She reports that she does not drink alcohol and does not use drugs.    Home Medications:  Fluticasone-Salmeterol, Multiple Vitamins-Minerals, Spacer/Aero-Holding Chambers, Vibegron, albuterol sulfate HFA, amLODIPine, aspirin, empagliflozin, estradiol, lisinopril, metoprolol tartrate, omeprazole, and spironolactone    Allergies:  No Known  Allergies    Review of Systems   All systems were reviewed and negative except for: Right hip pain, left pelvic pain    Objective   Objective     Vitals:        Physical Exam    Constitutional: Awake, alert, no acute distress   Eyes: Pupils equal, sclerae anicteric, no conjunctival injection   HENT: NCAT, mucous membranes moist   Neck: Supple, no thyromegaly, no lymphadenopathy, trachea midline   Respiratory: Clear to auscultation bilaterally, nonlabored respirations    Cardiovascular: RRR, no murmurs, rubs, or gallops, palpable pedal pulses bilaterally   Gastrointestinal: Positive bowel sounds, soft, nontender, nondistended   Musculoskeletal: No bilateral ankle edema, no clubbing or cyanosis to extremities.  Right lower extremity is shortened and externally rotated.  Neurovascularly intact in bilateral lower extremities.  Pulses present and equal in lower extremities.   Psychiatric: Appropriate affect, cooperative   Neurologic: Oriented x 3, strength symmetric in all extremities, Cranial Nerves grossly intact to confrontation, speech clear   Skin: No rashes     Imaging:   No results found.    Result Review    Result Review:  I have personally reviewed the results from the time of this admission to 10/5/2023 02:20 EDT and agree with these findings:  [x]  Laboratory  [x]  Microbiology  [x]  Radiology  [x]  EKG/Telemetry   []  Cardiology/Vascular   []  Pathology  [x]  Old records  []  Other:      Assessment & Plan   Assessment / Plan     Assessment:   Right hip fracture  Left pubic ramus fracture  Essential hypertension  COPD, not acutely exacerbated  GERD  Diastolic CHF, not acutely decompensated    Plan:    Admit to hospitalist service  Labs and imaging reviewed  Consult placed for orthopedics, Dr. Ambrose  Pain and nausea control are available  Keep n.p.o. until seen by orthopedics  PT and OT consulted    Resume home medications once they are reconciled.  Overall patient is unsure of most medications that she  takes, however she does tell me that she takes a baby aspirin every day due to her history of superficial thrombophlebitis.…  We will hold aspirin.  Her last dose was 10/4/2023.    Patient's clinical course will dictate further management  Discussed with outlying transferring ED physician, RN      DVT prophylaxis:  Mechanical DVT prophylaxis orders are present.    CODE STATUS:    Code Status (Patient has no pulse and is not breathing): CPR (Attempt to Resuscitate)  Medical Interventions (Patient has pulse or is breathing): Full Support      Admission Status:  I believe this patient meets inpatient status.    Electronically signed by RICHI Wallace, 10/05/23, 1:19 AM EDT.

## 2023-10-05 NOTE — PROGRESS NOTES
Admitted by my colleague earlier in the day for hip fracture. Plans are for OR later today. Adjust  pain control. Labs and vitals stable. Recheck after surgery

## 2023-10-05 NOTE — SIGNIFICANT NOTE
Wound Eval / Progress Noted    MARIA EUGENIA Mccall     Patient Name: Jeanne Dallas  : 1935  MRN: [id number]  Today's Date: 10/5/2023                 Admit Date: 10/5/2023    Visit Dx:    ICD-10-CM ICD-9-CM   1. Closed fracture of right hip, initial encounter  S72.001A 820.8         Closed right hip fracture        Past Medical History:   Diagnosis Date    Asthma     Congenital heart disease     Disease of thyroid gland     Hypertension         Past Surgical History:   Procedure Laterality Date    CARDIAC CATHETERIZATION           Physical Assessment:  Wound 10/05/23 0140 Right posterior elbow (Active)   Wound Image   10/05/23 1019   Dressing Appearance intact;dry 10/05/23 1045   Closure None 10/05/23 1045   Base dry;red;moist 10/05/23 1045   Red (%), Wound Tissue Color 100 10/05/23 1045   Periwound intact;dry;redness 10/05/23 1045   Periwound Temperature warm 10/05/23 1045   Periwound Skin Turgor soft 10/05/23 1045   Edges open 10/05/23 1045   Drainage Characteristics/Odor serosanguineous 10/05/23 1045   Drainage Amount small 10/05/23 1045   Care, Wound cleansed with;irrigated with;sterile normal saline 10/05/23 1045   Dressing Care dressing applied;non-adherent;petroleum-based;gauze;silicone;border dressing 10/05/23 1045   Periwound Care absorptive dressing applied 10/05/23 1045       Wound 10/05/23 0808 Right lower pubis Abcess (Active)   Wound Image    10/05/23 1019   Dressing Appearance open to air 10/05/23 1045   Closure None 10/05/23 1045   Base maroon/purple;dry 10/05/23 1045   Periwound intact;redness;indurated 10/05/23 1045   Periwound Temperature warm 10/05/23 1045   Periwound Skin Turgor firm 10/05/23 1045   Edges rolled/closed 10/05/23 1045   Drainage Amount none 10/05/23 1045   Care, Wound cleansed with;sterile normal saline 10/05/23 1045   Dressing Care dressing applied;dressing moistened;silver impregnated;hydrofiber;hydrocolloid 10/05/23 1045   Periwound Care absorptive dressing applied  10/05/23 1045        Wound Check / Follow-up:  Patient seen today for a wound consult. Family present at bedside.  Patient reports that she had an ingrown hair to the right anterior pubis appear approximately 3 weeks ago; has not been on antibiotics. Tissue had been reddened and tender, no complaints of pain at this time. Purple area to the center of the raised area; slight redness and induration. No tenderness or drainage during palpation.  Cleansed with NS and gauze. Recommend daily dressing changes with NS moistened silver impregnated hydrofiber to the wound and secure with a hydrocolloid dressing. Will discuss findings with MD.  Category 3 tear to the right elbow with no skin flap present. Wound base is red and moist with some redness to the periwound. Cleansed with NS. Recommend to follow skin tear protocol at this time.  Patient denies any further needs or wounds at this time    Short term goals:  regain skin integrity, skin protection, moisture prevention, daily dressing changes, skin tear protocol    Sandi Gold RN    10/5/2023    11:37 EDT

## 2023-10-05 NOTE — SIGNIFICANT NOTE
Patient is a 86-year-old female with a past medical history of hypertension, COPD, GERD, and diastolic CHF who presented to [facility] emergency department after sustaining a fall and having immediate right hip pain.  She typically ambulates with a cane, states that she just lost her footing and fell.  At the [facility] emergency department she is found to have a right hip fracture and a left pubic ramus fracture.  Laboratory evaluation overall unremarkable at the [facility] facility.    Orthopedics, Dr. Ambrose, was contacted and agreed to see the patient in consultation.  Because the above, the hospitalist team was contacted to admit the patient for further management of her right hip fracture and left pubic ramus fracture.    Electronically signed by RICHI Wallace, 10/04/23, 10:25 PM EDT.

## 2023-10-05 NOTE — ANESTHESIA PREPROCEDURE EVALUATION
Anesthesia Evaluation     Patient summary reviewed and Nursing notes reviewed   no history of anesthetic complications:   NPO Solid Status: > 8 hours  NPO Liquid Status: > 2 hours           Airway   Mallampati: II  TM distance: >3 FB  Neck ROM: full  No difficulty expected  Dental    (+) lower dentures and upper dentures    Pulmonary - normal exam    breath sounds clear to auscultation  (+) COPD, asthma,shortness of breath  Cardiovascular - normal exam  Exercise tolerance: good (4-7 METS)    ECG reviewed  Rhythm: regular  Rate: normal    (+) hypertension      Neuro/Psych- negative ROS  GI/Hepatic/Renal/Endo    (+) thyroid problem     Musculoskeletal (-) negative ROS    Abdominal    Substance History - negative use     OB/GYN negative ob/gyn ROS         Other - negative ROS       ROS/Med Hx Other: PAT Nursing Notes unavailable.             Latest Reference Range & Units 10/05/23 01:34   Sodium 136 - 145 mmol/L 143   Potassium 3.5 - 5.2 mmol/L 4.0   Chloride 98 - 107 mmol/L 105   CO2 22.0 - 29.0 mmol/L 25.7   Anion Gap 5.0 - 15.0 mmol/L 12.3   BUN 8 - 23 mg/dL 19   Creatinine 0.57 - 1.00 mg/dL 0.68   BUN/Creatinine Ratio 7.0 - 25.0  27.9 (H)   eGFR >60.0 mL/min/1.73 84.4   Glucose 65 - 99 mg/dL 147 (H)   Calcium 8.6 - 10.5 mg/dL 9.4   Magnesium 1.6 - 2.4 mg/dL 2.1   Alkaline Phosphatase 39 - 117 U/L 63   Total Protein 6.0 - 8.5 g/dL 6.7   Albumin 3.5 - 5.2 g/dL 4.3   Globulin gm/dL 2.4   A/G Ratio g/dL 1.8   AST (SGOT) 1 - 32 U/L 17   ALT (SGPT) 1 - 33 U/L 12   Total Bilirubin 0.0 - 1.2 mg/dL 0.7   (H): Data is abnormally high     Latest Reference Range & Units 10/05/23 01:34   Hemoglobin 12.0 - 15.9 g/dL 12.1   Hematocrit 34.0 - 46.6 % 37.8     ABNORMAL ECG -  Sinus rhythm  Left axis deviation  Probable anteroseptal infarct, old  No previous ECG available for comparison               Anesthesia Plan    ASA 2     general     Reason for not using neuraxial anesthesia or peripheral nerve block: Patient  Preference  (Patient understands anesthesia not responsible for dental damage.    Pt prefers general anesthesia for hip after full consent and r/b discussed.  All questions answered, family members at bedside.)  intravenous induction     Anesthetic plan, risks, benefits, and alternatives have been provided, discussed and informed consent has been obtained with: patient.    Use of blood products discussed with patient .    Plan discussed with CRNA.      CODE STATUS:    Code Status (Patient has no pulse and is not breathing): CPR (Attempt to Resuscitate)  Medical Interventions (Patient has pulse or is breathing): Full Support

## 2023-10-05 NOTE — CONSULTS
[facility]   Consult Note    Patient Name: Jeanne Dallas  : 1935  MRN: [id number]  Primary Care Physician:  Sourav Gonzalez MD  Referring Physician: No Known Provider  Date of admission: 10/5/2023    Subjective   Subjective     Reason for Consult/ Chief Complaint: Right hip pain    HPI:  Jeanne Dallas is a 87 y.o. female who had a mechanical fall at home yesterday evening when she lost her balance.  She was taken to [facility] in [place].  X-rays revealed a displaced femoral neck fracture.  She was transferred to [facility].  She reports pain in the right hip.  She is otherwise doing well and has no recent health issues.  She was previously independently ambulatory.    Review of Systems   All systems were reviewed and negative except for those mentioned in HPI    Personal History     Past Medical History:   Diagnosis Date    Asthma     Congenital heart disease     Disease of thyroid gland     Hypertension        Past Surgical History:   Procedure Laterality Date    CARDIAC CATHETERIZATION         Family History: family history includes Heart attack in her mother; Heart failure in her mother; Stroke in her father. Otherwise pertinent FHx was reviewed and not pertinent to current issue.    Social History:  reports that she has never smoked. She has been exposed to tobacco smoke. She has never used smokeless tobacco. She reports that she does not drink alcohol and does not use drugs.    Home Medications:  Fluticasone-Salmeterol, Multiple Vitamins-Minerals, Spacer/Aero-Holding Chambers, Vibegron, albuterol sulfate HFA, amLODIPine, aspirin, empagliflozin, estradiol, lisinopril, metoprolol tartrate, omeprazole, and spironolactone    Allergies:  No Known Allergies    Objective    Objective     Vitals:   Temp:  [97.8 °F (36.6 °C)] 97.8 °F (36.6 °C)  Heart Rate:  [65-72] 72  Resp:  [14-16] 16  BP: (124-140)/(40-43) 140/43  Flow (L/min):  [2] 2    Physical  Exam:   Constitutional: Awake, alert   HENT: NCAT, mucous membranes moist   Neck: Supple   Respiratory: Unlabored breathing   Cardiovascular: Regular heart rate   Gastrointestinal: Positive bowel sounds, soft, nontender, nondistended   Musculoskeletal: Right lower extremity is shortened and externally rotated.  Positive pulses.  Tender to palpation of the hip.  Mild swelling.  Skin intact.  Negative Mehrdad.  Neurovascular intact extremity.   Psychiatric: Appropriate affect, cooperative   Neurologic: Oriented x 3, strength symmetric in all extremities, Cranial Nerves grossly intact to confrontation, speech clear   Skin: No rashes     Result Review    Result Review:  I have personally reviewed the results from the time of this admission to 10/5/2023 07:59 EDT and agree with these findings:  [x]  Laboratory  []  Microbiology  [x]  Radiology  []  EKG/Telemetry   []  Cardiology/Vascular   []  Pathology  []  Old records  []  Other:    Most notable findings include: Right femoral neck fracture    Assessment & Plan   Assessment / Plan     Brief Patient Summary:  Jeanne Dallas is a 87 y.o. female who has right femoral neck fracture    Active Hospital Problems:  Active Hospital Problems    Diagnosis     **Closed right hip fracture        Plan: I discussed treatment options with the patient.  Operative versus nonoperative treatment was discussed.  Risks and benefits of surgery were discussed and the patient wishes to proceed.  Plan for n.p.o. and surgery later today with right hip hemiarthroplasty.  Thank you for the consultation.        Electronically signed by Emmett Ambrose MD, 10/05/23, 7:59 AM EDT.

## 2023-10-05 NOTE — PLAN OF CARE
Goal Outcome Evaluation:  Plan of Care Reviewed With: patient, daughter        Progress: no change  Outcome Evaluation: Pt. remains NPO this shift. Unable to complete PTA, Pt. uses [facility] in [place]. Neurovascular checks intact to RLE. Morphine given per orders for pain control. Pt. is A & O X 4.

## 2023-10-05 NOTE — PLAN OF CARE
Goal Outcome Evaluation:  Plan of Care Reviewed With: patient, daughter           Outcome Evaluation: PT is AAOx4, VSS. NPO since midnight 10/5/23. Pain control with PRN Dilaudid for (R) hip fracture. Nausea control with PRN Zofran. PT left the floor for OR @ 1608 for a (R) hip arthoplasty with Dr. Ambrose. Continue with current POC at this time.

## 2023-10-06 VITALS
DIASTOLIC BLOOD PRESSURE: 37 MMHG | HEART RATE: 87 BPM | TEMPERATURE: 97.3 F | OXYGEN SATURATION: 96 % | SYSTOLIC BLOOD PRESSURE: 117 MMHG | RESPIRATION RATE: 16 BRPM | HEIGHT: 65 IN | BODY MASS INDEX: 24.06 KG/M2 | WEIGHT: 144.4 LBS

## 2023-10-06 LAB
HCT VFR BLD AUTO: 34 % (ref 34–46.6)
HGB BLD-MCNC: 10.6 G/DL (ref 12–15.9)
QT INTERVAL: 439 MS
QTC INTERVAL: 472 MS

## 2023-10-06 PROCEDURE — 25010000002 CEFAZOLIN IN DEXTROSE 2-4 GM/100ML-% SOLUTION: Performed by: ORTHOPAEDIC SURGERY

## 2023-10-06 PROCEDURE — 97165 OT EVAL LOW COMPLEX 30 MIN: CPT

## 2023-10-06 PROCEDURE — 94799 UNLISTED PULMONARY SVC/PX: CPT

## 2023-10-06 PROCEDURE — 25810000003 LACTATED RINGERS PER 1000 ML: Performed by: ORTHOPAEDIC SURGERY

## 2023-10-06 PROCEDURE — 97161 PT EVAL LOW COMPLEX 20 MIN: CPT

## 2023-10-06 PROCEDURE — 85014 HEMATOCRIT: CPT | Performed by: ORTHOPAEDIC SURGERY

## 2023-10-06 PROCEDURE — 94761 N-INVAS EAR/PLS OXIMETRY MLT: CPT

## 2023-10-06 PROCEDURE — 85018 HEMOGLOBIN: CPT | Performed by: ORTHOPAEDIC SURGERY

## 2023-10-06 RX ORDER — PSEUDOEPHEDRINE HCL 30 MG
100 TABLET ORAL 2 TIMES DAILY PRN
Start: 2023-10-06

## 2023-10-06 RX ORDER — HYDROCODONE BITARTRATE AND ACETAMINOPHEN 5; 325 MG/1; MG/1
1 TABLET ORAL EVERY 4 HOURS PRN
Refills: 0
Start: 2023-10-06 | End: 2023-10-12

## 2023-10-06 RX ADMIN — ACETAMINOPHEN 1000 MG: 500 TABLET ORAL at 08:28

## 2023-10-06 RX ADMIN — APIXABAN 2.5 MG: 2.5 TABLET, FILM COATED ORAL at 08:28

## 2023-10-06 RX ADMIN — CEFAZOLIN SODIUM 2 G: 2 INJECTION, SOLUTION INTRAVENOUS at 08:28

## 2023-10-06 RX ADMIN — Medication 3 ML: at 00:03

## 2023-10-06 RX ADMIN — SODIUM CHLORIDE, POTASSIUM CHLORIDE, SODIUM LACTATE AND CALCIUM CHLORIDE 100 ML/HR: 600; 310; 30; 20 INJECTION, SOLUTION INTRAVENOUS at 08:28

## 2023-10-06 NOTE — ANESTHESIA POSTPROCEDURE EVALUATION
Patient: Jeanne Dallas    Procedure Summary       Date: 10/05/23 Room / Location: [facility] OR 06 / [facility] MAIN OR    Anesthesia Start: 1931 Anesthesia Stop: 2114    Procedure: HIP HEMIARTHROPLASTY ANTERIOR (Right: Hip) Diagnosis:       Closed fracture of right hip, initial encounter      (Closed fracture of right hip, initial encounter [S72.001A])    Surgeons: Emmett Ambrose MD Provider: Holly Ruth CRNA    Anesthesia Type: general ASA Status: 2            Anesthesia Type: general    Vitals  Vitals Value Taken Time   /37 10/05/23 2225   Temp 36.9 °C (98.4 °F) 10/05/23 2110   Pulse 90 10/05/23 2227   Resp 18 10/05/23 2225   SpO2 90 % 10/05/23 2227   Vitals shown include unvalidated device data.        Post Anesthesia Care and Evaluation    Patient location during evaluation: bedside  Patient participation: complete - patient participated  Level of consciousness: awake  Pain management: adequate  Reason for not using neuraxial anesthesia or a peripheral nerve block: regional anesthesia not indicated:  Airway patency: patent  PONV Status: none  Cardiovascular status: acceptable and stable  Respiratory status: acceptable  Hydration status: acceptable    Comments: An Anesthesiologist personally participated in the most demanding procedures (including induction and emergence if applicable) in the anesthesia plan, monitored the course of anesthesia administration at frequent intervals and remained physically present and available for immediate diagnosis and treatment of emergencies.        Pt received duo neb treatment post procedure.  Stable doing well

## 2023-10-06 NOTE — PLAN OF CARE
Goal Outcome Evaluation:  Plan of Care Reviewed With: patient, daughter        Progress: no change  Outcome Evaluation: Patient presents with limitations in self-care, functional transfers, and balance. She would benefit from continued skilled occupational therapy services to maximize independence with ADLs/functional transfers.      Anticipated Discharge Disposition (OT): inpatient rehabilitation facility

## 2023-10-06 NOTE — PROGRESS NOTES
[facility]   Hospitalist Progress Note       Patient Name: Jeanne Dallas  : 1935  MRN: [id number]  Primary Care Physician: Sourav Gonzalez MD  Date of admission: 10/5/2023  Today's Date: 10/6/2023  Room / Bed:   231/1  Subjective   Chief Complaint: Right hip pain     HPI:     Jeanne Dallas is a 87 y.o. female who has a past medical history of hypertension, COPD, GERD, and diastolic CHF who presented to [facility] emergency department after sustaining a fall and having immediate right hip pain.  She typically ambulates with a cane, states that she just lost her footing and fell.  At the [facility] emergency department she is found to have a right hip fracture and a left pubic ramus fracture.  Laboratory evaluation overall unremarkable at the [facility] facility.     Orthopedics, Dr. Ambrose, was contacted and agreed to see the patient in consultation.  Because the above, the hospitalist team was contacted to admit the patient for further management of her right hip fracture and left pubic ramus fracture.       Interval Followup: 10/6/2023    Very pleasant lady.  Daughter at bedside.  She is feeling reasonably well after surgery yesterday.  No overnight events.  No nausea.  Right hip pain easily controlled with current medical regimen.  Vital signs stable.  No fevers.  Patient is weightbearing as tolerated.  No obvious postop complications or issues.  Awaiting rehab bed.  BP range 113/44 - 110/44  2-3L here (has home O2, uses sporadically.)  Sats 95%  Has ambulated short distances on new right hip pain      REVIEW OF SYSTEMS:   Tripped.  Right hip pain.  Objective   Temp:  [97.7 °F (36.5 °C)-98.6 °F (37 °C)] 97.7 °F (36.5 °C)  Heart Rate:  [73-94] 82  Resp:  [10-18] 16  BP: (108-153)/(30-63) 127/39  Flow (L/min):  [1-14] 3  PHYSICAL EXAM   CON: WN. WD. NAD.   NECK:  No thyromegaly. No stridor.   RESP:  CTA. No wheezes. No crackles.  No work of breathing or tachypnea.   CV:  Rhythm regular.  Rate WNL. No murmur noted.  No edema.  GI:  Soft and nontender. Nondistended.    EXT: Right hip dressing intact.  Distally, RLE intact neurovascularly.  PSYCH:  Alert. Oriented. Normal affect and mood.  NEURO:  No dysarthria or aphasia. No unilateral weakness or paresthesia.  SKIN: No chronic venous stasis changes or varicosities.  No cellulitis  Results from last 7 days   Lab Units 10/06/23  0441 10/05/23  0134   WBC 10*3/mm3  --  14.85*   HEMOGLOBIN g/dL 10.6* 12.1   HEMATOCRIT % 34.0 37.8   PLATELETS 10*3/mm3  --  116*     Results from last 7 days   Lab Units 10/05/23  0134   SODIUM mmol/L 143   POTASSIUM mmol/L 4.0   CO2 mmol/L 25.7   CHLORIDE mmol/L 105   ANION GAP mmol/L 12.3   BUN mg/dL 19   CREATININE mg/dL 0.68   GLUCOSE mg/dL 147*     Results from last 7 days   Lab Units 10/05/23  0134   INR  1.11     COMPLEXITY OF DATA / DECISION MAKING     []  Moderate: One acute illness or mild exacerbation of chronic or 2 stable chronic or tx side effects   []  High:  Severe acute illness or severe exacerbation of chronic - potential for major debility / life threatening         I have personally reviewed the results from the time of this admission to 10/6/2023 11:05 EDT:  []  Laboratory:  []  Microbiology: []  Radiology:  []  Telemetry:   []  Cardiology/Vascular:  []  Pathology:  []  Prior external records:  []  Independent historian provided additional details:      []  Discussed case with specialists:    []  Independent interpretation of ECG/Imaging etc:             []  Moderate: Rx management, low risk surgery, suboptimal social situation   []  High:  Rx with close monitoring for toxicity, mod-high risk surgery, DNR decision, Comfort initiated, IV pain meds    Assessment / Plan   Assessment:    Right hip fracture  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  Eliquis for DVT prophylaxis after surgery  Labs and imaging reviewed  Consult placed for orthopedics, Dr. Ambrose  Pain and nausea control are available  Keep n.p.o. until seen by orthopedics  PT and OT consulted  DVT prophylaxis:  Medical and mechanical DVT prophylaxis orders are present.  CODE STATUS:      Code Status (Patient has no pulse and is not breathing): CPR (Attempt to Resuscitate)  Medical Interventions (Patient has pulse or is breathing): Full Support       Electronically signed by RICHI Germain, 10/06/23, 11:05 AM EDT.

## 2023-10-06 NOTE — THERAPY EVALUATION
Acute Care - Physical Therapy Initial Evaluation   [place]     Patient Name: Jeanne Dallas  : 1935  MRN: [id number]  Today's Date: 10/6/2023   Admit date: 10/5/2023     Referring Physician: Michelle Maxwell MD     Surgery Date:10/5/2023   Procedure(s) (LRB):  HIP HEMIARTHROPLASTY ANTERIOR (Right)        Visit Dx:     ICD-10-CM ICD-9-CM   1. Closed fracture of right hip, initial encounter  S72.001A 820.8   2. Difficulty in walking  R26.2 719.7   3. Decreased activities of daily living (ADL)  Z78.9 V49.89     Patient Active Problem List   Diagnosis    Chronic dyspnea    Diastolic dysfunction    Asthma-COPD overlap syndrome    Abnormal CT scan, chest    Thyroid nodule    Closed right hip fracture     Past Medical History:   Diagnosis Date    Asthma     Congenital heart disease     Disease of thyroid gland     Hypertension      Past Surgical History:   Procedure Laterality Date    CARDIAC CATHETERIZATION      HIP HEMIARTHROPLASTY Right 10/5/2023    Procedure: HIP HEMIARTHROPLASTY ANTERIOR;  Surgeon: Emmett Ambrose MD;  Location: [facility] OR;  Service: Orthopedics;  Laterality: Right;     PT Assessment (last 12 hours)       PT Evaluation and Treatment       Row Name 10/06/23 1153          Physical Therapy Time and Intention    Subjective Information complains of;weakness;pain (P)   -RH     Document Type evaluation (P)   -RH     Mode of Treatment individual therapy;physical therapy (P)   -RH     Patient Effort good (P)   -RH     Symptoms Noted During/After Treatment fatigue;increased pain (P)   -RH       Row Name 10/06/23 1155          General Information    Patient Profile Reviewed yes (P)   -RH     Patient Observations alert;cooperative;agree to therapy (P)   -RH     Prior Level of Function independent:;min assist:;all household mobility;ADL's (P)   -RH     Equipment Currently Used at Home walker, rolling;cane, quad (P)   -RH     Existing Precautions/Restrictions fall (P)   -RH       Row Name  10/06/23 1153          Living Environment    Current Living Arrangements home (P)   -RH     Home Accessibility other (see comments) (P)   no stairs  -RH     People in Home child(crystal), adult (P)   -RH     Primary Care Provided by self (P)   -RH       Row Name 10/06/23 1153          Home Use of Assistive/Adaptive Equipment    Equipment Currently Used at Home cane, quad;walker, rolling (P)   -RH       Row Name 10/06/23 1153          Range of Motion (ROM)    Range of Motion bilateral lower extremities;ROM is WFL (P)   -       Row Name 10/06/23 1153          Strength (Manual Muscle Testing)    Strength (Manual Muscle Testing) bilateral lower extremities (P)   LLE: 3+/5   RLE: Hip 2/5, all else 3/5  -RH       Row Name 10/06/23 1153          Mobility    Extremity Weight-bearing Status right lower extremity (P)   -RH     Right Lower Extremity (Weight-bearing Status) weight-bearing as tolerated (WBAT) (P)   -       Row Name 10/06/23 1153          Bed Mobility    Bed Mobility bed mobility (all) activities (P)   -RH     All Activities, [place] (Bed Mobility) moderate assist (50% patient effort);verbal cues (P)   -RH     Assistive Device (Bed Mobility) bed rails;draw sheet;head of bed elevated (P)   -       Row Name 10/06/23 1153          Transfers    Transfers sit-stand transfer;stand-sit transfer;squat pivot transfer (P)   -RH     Maintains Weight-bearing Status (Transfers) able to maintain (P)   -       Row Name 10/06/23 1153          Sit-Stand Transfer    Sit-Stand [place] (Transfers) maximum assist (25% patient effort);2 person assist (P)   -RH     Assistive Device (Sit-Stand Transfers) walker, front-wheeled (P)   -RH       Row Name 10/06/23 1153          Stand-Sit Transfer    Stand-Sit [place] (Transfers) maximum assist (25% patient effort);2 person assist (P)   -RH     Assistive Device (Stand-Sit Transfers) walker, front-wheeled (P)   -RH       Row Name 10/06/23 1153          Squat Pivot Transfer     Squat Pivot [place] (Transfers) maximum assist (25% patient effort);2 person assist (P)   -RH     Assistive Device (Squat Pivot Transfers) walker, front-wheeled (P)   -RH       Row Name 10/06/23 1153          Gait/Stairs (Locomotion)    Patient was able to Ambulate no, other medical factors prevent ambulation (P)   -RH     Reason Patient was unable to Ambulate Excessive Weakness (P)   -RH       Row Name 10/06/23 1153          Safety Issues, Functional Mobility    Impairments Affecting Function (Mobility) balance;endurance/activity tolerance;pain;strength (P)   -RH       Row Name 10/06/23 1153          Balance    Balance Assessment sitting dynamic balance;standing static balance (P)   -RH     Dynamic Sitting Balance contact guard (P)   -RH     Position, Sitting Balance sitting in chair (P)   -RH     Static Standing Balance maximum assist (P)   -RH     Position/Device Used, Standing Balance supported;walker, front-wheeled (P)   -RH       Row Name             Wound 10/05/23 0140 Right posterior elbow    Wound - Properties Group Placement Date: 10/05/23  -JH Placement Time: 0140  -JH Present on Hospital Admission: Y  -JH Side: Right  -JH Orientation: posterior  -JH Location: elbow  -JH    Retired Wound - Properties Group Placement Date: 10/05/23  -JH Placement Time: 0140  -JH Present on Hospital Admission: Y  -JH Side: Right  -JH Orientation: posterior  -JH Location: elbow  -JH    Retired Wound - Properties Group Date first assessed: 10/05/23  -JH Time first assessed: 0140  -JH Present on Hospital Admission: Y  -JH Side: Right  -JH Location: elbow  -JH      Row Name             Wound 10/05/23 0808 Right lower pubis Abcess    Wound - Properties Group Placement Date: 10/05/23  -MT Placement Time: 0808  -MT Present on Hospital Admission: Y  -MT Side: Right  -MT Orientation: lower  -MT Location: pubis  -MT Primary Wound Type: Abcess  -MT    Retired Wound - Properties Group Placement Date: 10/05/23  -MT Placement  Time: 0808  -MT Present on Hospital Admission: Y  -MT Side: Right  -MT Orientation: lower  -MT Location: pubis  -MT Primary Wound Type: Abcess  -MT    Retired Wound - Properties Group Date first assessed: 10/05/23  -MT Time first assessed: 0808 -MT Present on Hospital Admission: Y  -MT Side: Right  -MT Location: pubis  -MT Primary Wound Type: Abcess  -MT      Row Name             Wound 10/05/23 2000 Right anterior hip Incision    Wound - Properties Group Placement Date: 10/05/23  -HT Placement Time: 2000 -HT Side: Right  -HT Orientation: anterior  -HT Location: hip  -HT Primary Wound Type: Incision  -HT    Retired Wound - Properties Group Placement Date: 10/05/23  -HT Placement Time: 2000 -HT Side: Right  -HT Orientation: anterior  -HT Location: hip  -HT Primary Wound Type: Incision  -HT    Retired Wound - Properties Group Date first assessed: 10/05/23  -HT Time first assessed: 2000 -HT Side: Right  -HT Location: hip  -HT Primary Wound Type: Incision  -HT      Row Name 10/06/23 1153          Plan of Care Review    Plan of Care Reviewed With patient;daughter (P)   -RH     Outcome Evaluation Patient presents with significant balance, endurance, and LE strength impairments that are limiting her functional mobility. She currently required mod-max assist for transfers and is unable to ambulate independently. Skilled PT services are required to address these deficits. (P)   -RH       Row Name 10/06/23 1153          Therapy Assessment/Plan (PT)    Rehab Potential (PT) good, to achieve stated therapy goals (P)   -RH     Criteria for Skilled Interventions Met (PT) skilled treatment is necessary (P)   -RH     Therapy Frequency (PT) daily (P)   -RH     Predicted Duration of Therapy Intervention (PT) 10 days (P)   -RH     Problem List (PT) problems related to;balance;mobility;strength;pain;other (see comments) (P)   endurance  -RH     Activity Limitations Related to Problem List (PT) unable to ambulate safely;unable to  transfer safely;BADLs not performed adequately or safely;IADLs not performed adequately or safely;community activities not performed adequately or safely (P)   -       Row Name 10/06/23 1153          Therapy Plan Review/Discharge Plan (PT)    Therapy Plan Review (PT) evaluation/treatment results reviewed;care plan/treatment goals reviewed;participants included;patient;daughter;participants voiced agreement with care plan (P)   -       Row Name 10/06/23 1153          Physical Therapy Goals    Bed Mobility Goal Selection (PT) bed mobility, PT goal 1 (P)   -RH     Transfer Goal Selection (PT) transfer, PT goal 1 (P)   -RH     Gait Training Goal Selection (PT) gait training, PT goal 1 (P)   -       Row Name 10/06/23 1153          Bed Mobility Goal 1 (PT)    Activity/Assistive Device (Bed Mobility Goal 1, PT) bed mobility activities, all (P)   -RH     [place] Level/Cues Needed (Bed Mobility Goal 1, PT) independent (P)   -RH     Time Frame (Bed Mobility Goal 1, PT) long term goal (LTG);10 days (P)   -RH       Row Name 10/06/23 1153          Transfer Goal 1 (PT)    Activity/Assistive Device (Transfer Goal 1, PT) transfers, all (P)   -RH     [place] Level/Cues Needed (Transfer Goal 1, PT) independent (P)   -RH     Time Frame (Transfer Goal 1, PT) long term goal (LTG);10 days (P)   -       Row Name 10/06/23 1153          Gait Training Goal 1 (PT)    Activity/Assistive Device (Gait Training Goal 1, PT) gait (walking locomotion);assistive device use;walker, rolling (P)   -RH     [place] Level (Gait Training Goal 1, PT) modified independence (P)   -RH     Distance (Gait Training Goal 1, PT) 50ft (P)   -RH     Time Frame (Gait Training Goal 1, PT) long term goal (LTG);10 days (P)   -RH               User Key  (r) = Recorded By, (t) = Taken By, (c) = Cosigned By      Initials Name Provider Type    Heather Emery LPN Licensed Nurse    Lynn Lord, RN Registered Nurse    Garrett Bosch, RN  Registered Nurse     Indra Chakraborty, PT Student PT Student                    Physical Therapy Education       Title: PT OT SLP Therapies (Done)       Topic: Physical Therapy (Done)       Point: Mobility training (Done)       Learning Progress Summary             Patient Acceptance, E,TB, VU by  at 10/6/2023 1201                         Point: Home exercise program (Done)       Learning Progress Summary             Patient Acceptance, E,TB, VU by  at 10/6/2023 1201                         Point: Body mechanics (Done)       Learning Progress Summary             Patient Acceptance, E,TB, VU by  at 10/6/2023 1201                         Point: Precautions (Done)       Learning Progress Summary             Patient Acceptance, E,TB, VU by  at 10/6/2023 1201                                         User Key       Initials Effective Dates Name Provider Type Discipline     08/16/23 -  Indra Chakraborty, PT Student PT Student PT                  PT Recommendation and Plan  Anticipated Discharge Disposition (PT): (P) inpatient rehabilitation facility  Planned Therapy Interventions (PT): (P) balance training, bed mobility training, gait training, home exercise program, motor coordination training, neuromuscular re-education, ROM (range of motion), stair training, strengthening, stretching, transfer training  Therapy Frequency (PT): (P) daily  Plan of Care Reviewed With: (P) patient, daughter  Outcome Evaluation: (P) Patient presents with significant balance, endurance, and LE strength impairments that are limiting her functional mobility. She currently required mod-max assist for transfers and is unable to ambulate independently. Skilled PT services are required to address these deficits.   Outcome Measures       Row Name 10/06/23 1200             How much help from another person do you currently need...    Turning from your back to your side while in flat bed without using bedrails? 3 (P)   -       Moving from lying on back to sitting on the side of a flat bed without bedrails? 3 (P)   -RH      Moving to and from a bed to a chair (including a wheelchair)? 2 (P)   -RH      Standing up from a chair using your arms (e.g., wheelchair, bedside chair)? 2 (P)   -RH      Climbing 3-5 steps with a railing? 1 (P)   -RH      To walk in hospital room? 1 (P)   -RH      AM-PAC 6 Clicks Score (PT) 12 (P)   -RH                User Key  (r) = Recorded By, (t) = Taken By, (c) = Cosigned By      Initials Name Provider Type     Indra Chakraborty, PT Student PT Student                     Time Calculation:    PT Charges       Row Name 10/06/23 1153             Time Calculation    PT Received On 10/06/23 (P)   -      PT Goal Re-Cert Due Date 10/15/23 (P)   -RH         Untimed Charges    PT Eval/Re-eval Minutes 25 (P)   -RH         Total Minutes    Untimed Charges Total Minutes 25 (P)   -RH       Total Minutes 25 (P)   -RH                User Key  (r) = Recorded By, (t) = Taken By, (c) = Cosigned By      Initials Name Provider Type     Indra Chakraborty, PT Student PT Student                      PT G-Codes  AM-PAC 6 Clicks Score (PT): (P) 12    Indra Chakraborty, PT Student  10/6/2023

## 2023-10-06 NOTE — PROGRESS NOTES
[facility]     Progress Note    Patient Name: Jeanne Dallas  : 1935  MRN: [id number]  Primary Care Physician:  Sourav Gonzalez MD  Date of admission: 10/5/2023    Subjective   Subjective     HPI:  Patient Reports doing well this morning.  Her pain is controlled.  She denies any chest pain or shortness of air.    Review of Systems   See HPI    Objective   Objective     Vitals:   Temp:  [97.7 °F (36.5 °C)-98.6 °F (37 °C)] 97.7 °F (36.5 °C)  Heart Rate:  [73-94] 81  Resp:  [10-18] 16  BP: (108-153)/(30-63) 127/39  Flow (L/min):  [1-14] 3.5  Physical Exam    General: Alert, no acute distress   Chest: Unlabored breathing, cardiovascular: Regular heart rate   Musculoskeletal: Neurovascular intact extremity.  Positive pulses.  Negative Mehrdad.  Dressing intact.    Result Review      WBC   Date Value Ref Range Status   10/05/2023 14.85 (H) 3.40 - 10.80 10*3/mm3 Final     RBC   Date Value Ref Range Status   10/05/2023 4.18 3.77 - 5.28 10*6/mm3 Final     Hemoglobin   Date Value Ref Range Status   10/06/2023 10.6 (L) 12.0 - 15.9 g/dL Final     Hematocrit   Date Value Ref Range Status   10/06/2023 34.0 34.0 - 46.6 % Final     MCV   Date Value Ref Range Status   10/05/2023 90.4 79.0 - 97.0 fL Final     MCH   Date Value Ref Range Status   10/05/2023 28.9 26.6 - 33.0 pg Final     MCHC   Date Value Ref Range Status   10/05/2023 32.0 31.5 - 35.7 g/dL Final     RDW   Date Value Ref Range Status   10/05/2023 14.2 12.3 - 15.4 % Final     RDW-SD   Date Value Ref Range Status   10/05/2023 47.4 37.0 - 54.0 fl Final     MPV   Date Value Ref Range Status   10/05/2023 12.9 (H) 6.0 - 12.0 fL Final     Platelets   Date Value Ref Range Status   10/05/2023 116 (L) 140 - 450 10*3/mm3 Final     Neutrophils Absolute   Date Value Ref Range Status   10/05/2023 12.47 (H) 1.70 - 7.00 10*3/mm3 Final        Result Review:  I have personally reviewed the results from the time of this admission to 10/6/2023 07:54 EDT and agree with  these findings:  [x]  Laboratory  []  Microbiology  [x]  Radiology  []  EKG/Telemetry   []  Cardiology/Vascular   []  Pathology  []  Old records  []  Other:      Assessment & Plan   Assessment / Plan     Brief Patient Summary:  Jeanne Dallas is a 87 y.o. female who is status post right hip hemiarthroplasty, left pubic rami fracture    Active Hospital Problems:  Active Hospital Problems    Diagnosis     **Closed right hip fracture      Plan: Weightbearing as tolerated with walker  Physical and Occupational Therapy  Pain control  DVT prophylaxis  Discharge planning: Inpatient rehab when available       DVT prophylaxis:  Medical and mechanical DVT prophylaxis orders are present.    CODE STATUS:   Code Status (Patient has no pulse and is not breathing): CPR (Attempt to Resuscitate)  Medical Interventions (Patient has pulse or is breathing): Full Support    Disposition:  I expect patient to be discharged inpatient rehab when able.    Electronically signed by Emmett Ambrose MD, 10/06/23, 7:54 AM EDT.

## 2023-10-06 NOTE — CASE MANAGEMENT/SOCIAL WORK
IP:  10/5/23 (Met IP Criteria)  DC:  10/6/23  LOS:  < 2MN   Exception: Patient was a DA - s/p R hip hemiarthroplasty anterior on 10/5/23 (inpatient procedure)

## 2023-10-06 NOTE — DISCHARGE SUMMARY
[facility]         HOSPITALIST  DISCHARGE SUMMARY    Patient Name: Jeanne Dallas  : 1935  MRN: [id number]    Date of Admission: 10/5/2023  Date of Discharge:  10/6/2023    Primary Care Physician: Sourav Gonzalez MD    Consults       Date and Time Order Name Status Description    10/5/2023  1:24 AM Inpatient Orthopedic Surgery Consult              Active and Resolved Hospital Problems:  Active Hospital Problems    Diagnosis POA    **Closed right hip fracture [S72.001A] Yes      Resolved Hospital Problems   No resolved problems to display.     Right hip fracture  Left pubic ramus fracture  Essential hypertension  COPD, not acutely exacerbated  GERD  Diastolic CHF, not acutely decompensated     Hospital Course     Hospital Course:  87 y.o. female who has a past medical history of hypertension, COPD, GERD, and diastolic CHF who presented to [facility] emergency department after sustaining a fall and having immediate right hip pain.  She typically ambulates with a cane, states that she just lost her footing and fell.  At the [facility] emergency department she is found to have a right hip fracture and a left pubic ramus fracture.  Laboratory evaluation overall unremarkable at the [facility] facility.  Ortho was consulted, the patient underwent a right Yifan arthroplasty anterior hip, tolerated procedure well, postprocedure pain is tolerable, hemoglobin is 10.6.  Discharged in hemodynamically stable addition to acute rehab at [facility], to follow-up with Ortho in 2 weeks.      Day of Discharge     Vital Signs:  Temp:  [97.3 °F (36.3 °C)-98.6 °F (37 °C)] 97.3 °F (36.3 °C)  Heart Rate:  [73-94] 87  Resp:  [10-18] 16  BP: (108-153)/(30-63) 117/37  Flow (L/min):  [1-14] 3  Review of systems:  All systems reviewed negative except for generalized weakness and fatigue    Physical Exam                         Constitutional: Awake, alert, no acute distress              Eyes: Pupils equal, sclerae  anicteric, no conjunctival injection              HENT: NCAT, mucous membranes moist              Neck: Supple, no thyromegaly, no lymphadenopathy, trachea midline              Respiratory: Clear to auscultation bilaterally, nonlabored respirations               Cardiovascular: RRR, no murmurs, rubs, or gallops, palpable pedal pulses bilaterally              Gastrointestinal: Positive bowel sounds, soft, nontender, nondistended              Musculoskeletal: No bilateral ankle edema, no clubbing or cyanosis to extremities. Neurovascularly intact in bilateral lower extremities.  Pulses present and equal in lower extremities.              Psychiatric: Appropriate affect, cooperative              Neurologic: Oriented x 3, strength symmetric in all extremities, Cranial Nerves grossly intact to confrontation, speech clear              Skin: No rashes         Discharge Details        Discharge Medications        New Medications        Instructions Start Date   apixaban 2.5 MG tablet tablet  Commonly known as: ELIQUIS   2.5 mg, Oral, Every 12 Hours Scheduled      docusate sodium 100 MG capsule   100 mg, Oral, 2 Times Daily PRN      HYDROcodone-acetaminophen 5-325 MG per tablet  Commonly known as: NORCO   1 tablet, Oral, Every 4 Hours PRN             Continue These Medications        Instructions Start Date   albuterol sulfate  (90 Base) MCG/ACT inhaler  Commonly known as: PROVENTIL HFA;VENTOLIN HFA;PROAIR HFA   2 puffs, Inhalation, Every 4 Hours PRN      aspirin 81 MG EC tablet   81 mg, Oral, Daily      estradiol 0.1 MG/GM vaginal cream  Commonly known as: ESTRACE   Insert 1 g into the vagina 3 (Three) Times a Day. Apply cream to vagina area 2-3 times per week      Jardiance 10 MG tablet tablet  Generic drug: empagliflozin   10 mg, Oral, Daily      lisinopril 10 MG tablet  Commonly known as: PRINIVIL,ZESTRIL   10 mg, Oral, 2 Times Daily      metoprolol tartrate 25 MG tablet  Commonly known as: LOPRESSOR   12.5 mg,  Oral, 2 Times Daily      omeprazole 20 MG capsule  Commonly known as: priLOSEC   20 mg, Oral, Daily      PRESERVISION AREDS 2 PO   Oral, Daily      spironolactone 25 MG tablet  Commonly known as: ALDACTONE   12.5 mg, Oral, Every Morning      Vibegron 75 MG tablet   75 mg, Oral, Daily               No Known Allergies    Discharge Disposition:  Rehab Facility or Unit (DC - External)    Diet:  Hospital:  Diet Order   Procedures    Diet: Regular/House Diet; Texture: Regular Texture (IDDSI 7); Fluid Consistency: Thin (IDDSI 0)       Discharge Activity: as tolerates      CODE STATUS:  Code Status and Medical Interventions:   Ordered at: 10/05/23 0118     Code Status (Patient has no pulse and is not breathing):    CPR (Attempt to Resuscitate)     Medical Interventions (Patient has pulse or is breathing):    Full Support         Future Appointments   Date Time Provider Department Center   1/31/2024  9:00 AM Saige Man APRN [facility] PCC ETW [facility]   7/2/2024  9:15 AM Laila Bowman APRKRYSTEN [facility] CD [facility]       Additional Instructions for the Follow-ups that You Need to Schedule       Discharge Follow-up with PCP   As directed       Currently Documented PCP:    Sourav Gonzalez MD    PCP Phone Number:    [phone]     Follow Up Details: 3 to 7 days                Pertinent  and/or Most Recent Results     PROCEDURES:   FL Surgery Fluoro    Result Date: 10/5/2023  This procedure was auto-finalized with no dictation required.    XR Hip With or Without Pelvis 2 - 3 View Right    Result Date: 10/5/2023  PROCEDURE: XR HIP W OR WO PELVIS 2-3 VIEW RIGHT  COMPARISON: 10/5/2023.  INDICATIONS: POST-OP RIGHT HIP ARTHROPLASTY.  IMAGING FOLLOW-UP.  FINDINGS:  Three views are provided for review.  The patient has undergone right hip hemiarthroplasty.  That is, a bipolar endoprosthesis is in place.  There is near-anatomic alignment.  The prosthetic hardware is intact.  No periprosthetic fractures are seen.  No other fractures are  identified.  Postoperative changes are present consistent with recent surgery including but not necessarily limited to subcutaneous emphysema, cutaneous surgical clips in place laterally and soft tissue swelling involving the proximal right lower extremity and the right pelvic wall, especially laterally.  Degenerative changes involve the imaged spine the bilateral sacroiliac joints.  Arterial calcifications are seen.        The patient has undergone right hip hemiarthroplasty.  The hardware is intact with near-anatomic alignment.  No dislocation is seen.  No periprosthetic fractures are identified.  No other postoperative complication is appreciated.  Please see above comments for further detail.     Please note that portions of this note were completed with a voice recognition program.  JUNE SIGALA JR, MD       Electronically Signed and Approved By: JUNE SIGALA JR, MD on 10/05/2023 at 22:40              XR Hip With or Without Pelvis 2 - 3 View Right    Result Date: 10/5/2023  PROCEDURE: XR HIP W OR WO PELVIS 2-3 VIEW RIGHT  COMPARISON: None  INDICATIONS: right hip fracture  FINDINGS:  Acute mildly displaced transcervical right femoral neck fracture.  There is fracture foreshortening and varus angulation.  Hip alignment appears maintained.  No other acute fractures.  Suspect mild to moderate degenerative osteoarthritis of both hip joints.  Multilevel lumbar spondylosis most notable at L4-L5.  Soft tissues grossly unremarkable.        Acute mildly displaced and foreshortened right transcervical femoral neck fracture.      MARTHA BETTS MD       Electronically Signed and Approved By: MARTHA BETTS MD on 10/05/2023 at 9:21                LAB RESULTS:      Lab 10/06/23  0441 10/05/23  0134   WBC  --  14.85*   HEMOGLOBIN 10.6* 12.1   HEMATOCRIT 34.0 37.8   PLATELETS  --  116*   NEUTROS ABS  --  12.47*   MCV  --  90.4   PROTIME  --  14.4   APTT  --  27.3         Lab 10/05/23  0134   SODIUM 143   POTASSIUM 4.0    CHLORIDE 105   CO2 25.7   ANION GAP 12.3   BUN 19   CREATININE 0.68   EGFR 84.4   GLUCOSE 147*   CALCIUM 9.4   MAGNESIUM 2.1         Lab 10/05/23  0134   TOTAL PROTEIN 6.7   ALBUMIN 4.3   GLOBULIN 2.4   ALT (SGPT) 12   AST (SGOT) 17   BILIRUBIN 0.7   ALK PHOS 63         Lab 10/05/23  0134   PROTIME 14.4   INR 1.11                 Brief Urine Lab Results       None          Microbiology Results (last 10 days)       ** No results found for the last 240 hours. **            XR Hip With or Without Pelvis 2 - 3 View Right    Result Date: 10/5/2023  Impression:   The patient has undergone right hip hemiarthroplasty.  The hardware is intact with near-anatomic alignment.  No dislocation is seen.  No periprosthetic fractures are identified.  No other postoperative complication is appreciated.  Please see above comments for further detail.     Please note that portions of this note were completed with a voice recognition program.  JUNE SIGALA JR, MD       Electronically Signed and Approved By: JUNE SIGALA JR, MD on 10/05/2023 at 22:40              XR Hip With or Without Pelvis 2 - 3 View Right    Result Date: 10/5/2023  Impression:   Acute mildly displaced and foreshortened right transcervical femoral neck fracture.      MARTHA BETTS MD       Electronically Signed and Approved By: MARTHA BETTS MD on 10/05/2023 at 9:21                Labs Pending at Discharge: None    Time spent on Discharge including face to face service:  32 minutes    Electronically signed by Michelle Maxwell MD, 10/06/23, 11:48 AM EDT.    Portions of this documentation were transcribed electronically from a voice recognition software.  I confirm all data accurately represents the service(s) I performed at today's visit.

## 2023-10-06 NOTE — PLAN OF CARE
Problem: Adult Inpatient Plan of Care  Goal: Plan of Care Review  Outcome: Met  Goal: Patient-Specific Goal (Individualized)  Outcome: Met  Goal: Absence of Hospital-Acquired Illness or Injury  Outcome: Met  Intervention: Identify and Manage Fall Risk  Recent Flowsheet Documentation  Taken 10/6/2023 0828 by Nidhi Parra RN  Safety Promotion/Fall Prevention:   safety round/check completed   assistive device/personal items within [facility]   fall prevention program maintained   clutter free environment maintained   nonskid shoes/slippers when out of bed  Intervention: Prevent and Manage VTE (Venous Thromboembolism) Risk  Recent Flowsheet Documentation  Taken 10/6/2023 0828 by Nidhi Parra RN  VTE Prevention/Management: sequential compression devices on  Goal: Optimal Comfort and Wellbeing  Outcome: Met  Intervention: Provide Person-Centered Care  Recent Flowsheet Documentation  Taken 10/6/2023 0828 by Nidhi Parra RN  Trust Relationship/Rapport:   care explained   empathic listening provided   thoughts/feelings acknowledged  Goal: Readiness for Transition of Care  Outcome: Met     Problem: Skin Injury Risk Increased  Goal: Skin Health and Integrity  Outcome: Met     Problem: Fall Injury Risk  Goal: Absence of Fall and Fall-Related Injury  Outcome: Met  Intervention: Promote Injury-Free Environment  Recent Flowsheet Documentation  Taken 10/6/2023 0828 by Nidhi Parra RN  Safety Promotion/Fall Prevention:   safety round/check completed   assistive device/personal items within [facility]   fall prevention program maintained   clutter free environment maintained   nonskid shoes/slippers when out of bed     Problem: Adjustment to Surgery (Hip Arthroplasty)  Goal: Optimal Coping  Outcome: Met     Problem: Bleeding (Hip Arthroplasty)  Goal: Absence of Bleeding  Outcome: Met     Problem: Bowel Motility Impaired (Hip Arthroplasty)  Goal: Effective Bowel Elimination  Outcome: Met     Problem: Fluid and Electrolyte  Imbalance (Hip Arthroplasty)  Goal: Fluid and Electrolyte Balance  Outcome: Met     Problem: Functional Ability Impaired (Hip Arthroplasty)  Goal: Optimal Functional Ability  Outcome: Met     Problem: Infection (Hip Arthroplasty)  Goal: Absence of Infection Signs and Symptoms  Outcome: Met     Problem: Neurovascular Compromise (Hip Arthroplasty)  Goal: Intact Neurovascular Status  Outcome: Met     Problem: Ongoing Anesthesia Effects (Hip Arthroplasty)  Goal: Anesthesia/Sedation Recovery  Outcome: Met  Intervention: Optimize Anesthesia Recovery  Recent Flowsheet Documentation  Taken 10/6/2023 0828 by Nidhi Parra, RN  Safety Promotion/Fall Prevention:   safety round/check completed   assistive device/personal items within reach   fall prevention program maintained   clutter free environment maintained   nonskid shoes/slippers when out of bed     Problem: Postoperative Nausea and Vomiting (Hip Arthroplasty)  Goal: Nausea and Vomiting Relief  Outcome: Met     Problem: Postoperative Urinary Retention (Hip Arthroplasty)  Goal: Effective Urinary Elimination  Outcome: Met     Problem: Respiratory Compromise (Hip Arthroplasty)  Goal: Effective Oxygenation and Ventilation  Outcome: Met     Problem: Pain Acute  Goal: Acceptable Pain Control and Functional Ability  Outcome: Met  Intervention: Optimize Psychosocial Wellbeing  Recent Flowsheet Documentation  Taken 10/6/2023 0828 by Nidhi Parra, RN  Diversional Activities: television   Goal Outcome Evaluation:

## 2023-10-06 NOTE — OP NOTE
HIP HEMIARTHROPLASTY ANTERIOR  Procedure Report    Patient Name:  Jeanne Dallas  YOB: 1935    Date of Surgery:  10/5/2023     Indications:  Patient sustained a displaced femoral neck fracture. Patient wishes to undergo operative treatment. Risks and benefits of operative treatment including bleeding, infection, damage to neurovascular structures, continued pain and disability, maulnion, non-union, need for additional procedures including hardware removal, among others. Informed consent was obtained and they wished to proceed.    Pre-op Diagnosis:   Closed fracture of right hip, initial encounter [S72.001A]       Post-Op Diagnosis Codes:     * Closed fracture of right hip, initial encounter [S72.001A]    Procedure/CPT® Codes:      Procedure(s):  Right HIP HEMIARTHROPLASTY ANTERIOR    Staff:  Surgeon(s):  Emmett Ambrose MD    Assistant: Arias Zhou PA    Anesthesia: Choice    Estimated Blood Loss: 100ml    Implants:    Implant Name Type Inv. Item Serial No.  Lot No. LRB No. Used Action   SUT NONABS MAXBRAID NMBR5 K60 38IN WHT/JIGNESH - [id number] Implant SUT NONABS MAXBRAID NMBR5 K60 38IN WHT/JIGNESH  [employer] [id number] Right 1 Implanted   KT PREP/CMT FEM QUICKUSE 2BONEPLUG PE - [id number] Implant KT PREP/CMT FEM QUICKUSE 2BONEPLUG PE  [employer] [id number] Right 1 Implanted   CMT BONE PALACOS R HI/VISC 1X40 - [id number] Implant CMT BONE PALACOS R HI/VISC 1X40  [facility] [id number] Right 2 Implanted   STEM FEM/HIP AVENIR CMT STD SZ5 - [id number] Implant STEM FEM/HIP AVENIR CMT STD SZ5  [employer] [id number] Right 1 Implanted   HD FEM/HIP UNIV COCR 12/14TPR 28MM MIN3.5 - [id number] Implant HD FEM/HIP UNIV COCR 12/14TPR 28MM MIN3.5  [employer] [id number] Right 1 Implanted   CUP ACET RINGLOC BIPOL 28MM 46MM - [id number] Implant CUP ACET RINGLOC BIPOL 28MM 46MM  [employer] [id number] Right 1 Implanted       Specimen:          None        Findings: displaced  femoral neck fracture    Complications: none    Description of Procedure: The operative site was marked preoperatively.  The patient was brought to the operating room.  Anesthesia was performed.  The patient was positioned on a [place] bed and a padded perineal post was placed.  Both legs were placed into padded traction boots.  All bony prominences were padded. The extremity was prepped and draped in usual sterile fashion. Formal timeout was held.  Preoperative antibiotic was given.     An anterior incision was made over the hip.  The subcutaneous tissues and fascia were divided.  Circumflex vessels were treated with the bipolar sealer and were coagulated.  The capsule was incised and hematoma was drained.  The capsule was tagged with nonabsorbable suture.  Femoral neck osteotomy was made in the femoral neck remnant and femoral head were removed.  The femoral head was sized in the acetabulum.    Proximal femoral releases were performed and the proximal femur was exposed with extension and external rotation.  We began broaching the proximal femur in neutral anteversion, sequentially broaching up in size until adequate axial and rotational stability was obtained.  Fluoroscopy was obtained of the femoral trial which showed good cortical fit.  At this point the trial broach was removed and the canal was prepped for cement.  A cement restrictor was placed in the femur was irrigated and dried.  Cement was mixed and was introduced into the canal with a cement gun.  The femoral stem was inserted in the place in neutral anteversion and was held in place until the cement had hardened.    This point a trial head was placed on the stem and the hip was trialed.  Stability was excellent and leg lengths appeared to be equal under fluoroscopy.  The bipolar head and shell were assembled and this was impacted onto the femoral stem and the hip was reduced a final time.  Stability was unchanged.  Final fluoroscopic images were taken.   The hip was irrigated with irrisept and topical transexemic acid was given.  The capsule was closed with nonabsorbable suture and the pericapsular tissues were injected with local anesthetic.  The fascia was closed with #1 Vicryl and the subcutaneous tissues with 2-0 Vicryl.  Skin staples were placed and an Aquacel dressing was placed.  Patient awoke from anesthesia in stable condition and all counts were correct.  Patient was stable to recovery.    Assistant: Arias Zhou PA  was responsible for performing the following activities: Retraction, Suction, Irrigation, and Placing Dressing and their skilled assistance was necessary for the success of this case.    Emmett Ambrose MD     Date: 10/5/2023  Time: 21:01 EDT

## 2023-10-06 NOTE — PLAN OF CARE
Goal Outcome Evaluation:  Plan of Care Reviewed With: (P) patient, daughter           Outcome Evaluation: (P) Patient presents with significant balance, endurance, and LE strength impairments that are limiting her functional mobility. She currently required mod-max assist for transfers and is unable to ambulate independently. Skilled PT services are required to address these deficits.      Anticipated Discharge Disposition (PT): (P) inpatient rehabilitation facility

## 2023-10-19 ENCOUNTER — TELEPHONE (OUTPATIENT)
Dept: ORTHOPEDIC SURGERY | Facility: CLINIC | Age: 88
End: 2023-10-19

## 2023-10-19 ENCOUNTER — OFFICE VISIT (OUTPATIENT)
Dept: ORTHOPEDIC SURGERY | Facility: CLINIC | Age: 88
End: 2023-10-19
Payer: MEDICARE

## 2023-10-19 VITALS — HEIGHT: 65 IN | WEIGHT: 146 LBS | BODY MASS INDEX: 24.32 KG/M2

## 2023-10-19 DIAGNOSIS — M25.551 RIGHT HIP PAIN: Primary | ICD-10-CM

## 2023-10-19 DIAGNOSIS — Z96.641 S/P HIP REPLACEMENT, RIGHT: ICD-10-CM

## 2023-10-19 NOTE — TELEPHONE ENCOUNTER
SPOKE WITH NURSE FROM [facility] AND SHE GAVE PERMISSION TO BILL THEIR FACILITY FOR XRAYS DONE AT TODAY'S VISIT.

## 2023-10-19 NOTE — PROGRESS NOTES
"Chief Complaint  Follow-up of the Right Hip     Subjective      Jeanne Dallas presents to [facility] for follow up evaluation of the right hip. The patient is S/P Right hip Hemiarthroplasty 10/5/23. Her staples were removed today. She is overall doing well. She is ambulating in a wheelchair. She denies pain to her hip. She does report thigh pain. She is at [facility] and going home tomorrow.     No Known Allergies     Social History     Socioeconomic History    Marital status:    Tobacco Use    Smoking status: Never     Passive exposure: Past    Smokeless tobacco: Never   Vaping Use    Vaping Use: Never used   Substance and Sexual Activity    Alcohol use: Never    Drug use: Never    Sexual activity: Defer        I reviewed the patient's chief complaint, history of present illness, review of systems, past medical history, surgical history, family history, social history, medications, and allergy list.     Review of Systems     Constitutional: Denies fevers, chills, weight loss  Cardiovascular: Denies chest pain, shortness of breath  Skin: Denies rashes, acute skin changes  Neurologic: Denies headache, loss of consciousness  MSK: Right hip pain      Vital Signs:   Ht 165.1 cm (65\")   Wt 66.2 kg (146 lb)   BMI 24.30 kg/m²          Physical Exam  General: Alert. No acute distress    Ortho Exam      Right hip- Positive EHL, FHL, GS and TA. Sensation intact to all 5 nerves of the foot. Positive pulses. Incision well healing. No signs of infection. Neurovascularly intact. Sensation grossly intact. No pain with passive hip motion.     Procedures    X-Ray Report:  Right hip X-Ray  Indication: Evaluation of right hip pain  AP/Lateral view(s)  Findings: intact right bipolar hip replacement No signs of loosening, subsidence or periprosthetic fracture.   Prior studies available for comparison: yes       Imaging Results (Most Recent)       Procedure Component Value Units Date/Time    " XR Hip With or Without Pelvis 2 - 3 View Right [[id number]] Resulted: 10/19/23 0853     Updated: 10/19/23 0857             Result Review :       XR Hip With or Without Pelvis 2 - 3 View Right    Result Date: 10/5/2023  Narrative: PROCEDURE: XR HIP W OR WO PELVIS 2-3 VIEW RIGHT  COMPARISON: 10/5/2023.  INDICATIONS: POST-OP RIGHT HIP ARTHROPLASTY.  IMAGING FOLLOW-UP.  FINDINGS:  Three views are provided for review.  The patient has undergone right hip hemiarthroplasty.  That is, a bipolar endoprosthesis is in place.  There is near-anatomic alignment.  The prosthetic hardware is intact.  No periprosthetic fractures are seen.  No other fractures are identified.  Postoperative changes are present consistent with recent surgery including but not necessarily limited to subcutaneous emphysema, cutaneous surgical clips in place laterally and soft tissue swelling involving the proximal right lower extremity and the right pelvic wall, especially laterally.  Degenerative changes involve the imaged spine the bilateral sacroiliac joints.  Arterial calcifications are seen.      Impression:   The patient has undergone right hip hemiarthroplasty.  The hardware is intact with near-anatomic alignment.  No dislocation is seen.  No periprosthetic fractures are identified.  No other postoperative complication is appreciated.  Please see above comments for further detail.     Please note that portions of this note were completed with a voice recognition program.  JUNE SIGALA JR, MD       Electronically Signed and Approved By: JUNE SIGALA JR, MD on 10/05/2023 at 22:40              FL Surgery Fluoro    Result Date: 10/5/2023  Narrative: This procedure was auto-finalized with no dictation required.    XR Hip With or Without Pelvis 2 - 3 View Right    Result Date: 10/5/2023  Narrative: PROCEDURE: XR HIP W OR WO PELVIS 2-3 VIEW RIGHT  COMPARISON: None  INDICATIONS: right hip fracture  FINDINGS:  Acute mildly displaced transcervical right  femoral neck fracture.  There is fracture foreshortening and varus angulation.  Hip alignment appears maintained.  No other acute fractures.  Suspect mild to moderate degenerative osteoarthritis of both hip joints.  Multilevel lumbar spondylosis most notable at L4-L5.  Soft tissues grossly unremarkable.      Impression:   Acute mildly displaced and foreshortened right transcervical femoral neck fracture.      MARTHA BETTS MD       Electronically Signed and Approved By: MARTHA BETTS MD on 10/05/2023 at 9:21                     Assessment and Plan     Diagnoses and all orders for this visit:    1. Right hip pain (Primary)  -     XR Hip With or Without Pelvis 2 - 3 View Right    2. S/P Right hip Hemiarthroplasty 10/5/23        Discussed the treatment plan with the patient.  I reviewed the x-rays that were obtained today with the patient. Plan for activity as tolerated. Plan to continue therapy.     Call or return if worsening symptoms.    Follow Up     6 weeks       Patient was given instructions and counseling regarding her condition or for health maintenance advice. Please see specific information pulled into the AVS if appropriate.     Scribed for Emmett Ambrose MD by Laina Arshad.  10/19/23   08:51 EDT    I have personally performed the services described in this document as scribed by the above individual and it is both accurate and complete. Emmett Ambrose MD 10/19/23

## 2023-12-01 ENCOUNTER — OFFICE VISIT (OUTPATIENT)
Dept: ORTHOPEDIC SURGERY | Facility: CLINIC | Age: 88
End: 2023-12-01
Payer: MEDICARE

## 2023-12-01 ENCOUNTER — TELEPHONE (OUTPATIENT)
Dept: ORTHOPEDIC SURGERY | Facility: CLINIC | Age: 88
End: 2023-12-01

## 2023-12-01 ENCOUNTER — HOSPITAL ENCOUNTER (OUTPATIENT)
Dept: CARDIOLOGY | Facility: HOSPITAL | Age: 88
Discharge: HOME | End: 2023-12-01
Payer: MEDICARE

## 2023-12-01 VITALS
WEIGHT: 145.94 LBS | BODY MASS INDEX: 24.32 KG/M2 | HEART RATE: 72 BPM | SYSTOLIC BLOOD PRESSURE: 194 MMHG | DIASTOLIC BLOOD PRESSURE: 77 MMHG | HEIGHT: 65 IN | OXYGEN SATURATION: 90 %

## 2023-12-01 DIAGNOSIS — Z96.641 S/P HIP REPLACEMENT, RIGHT: ICD-10-CM

## 2023-12-01 DIAGNOSIS — M79.661 RIGHT CALF PAIN: ICD-10-CM

## 2023-12-01 DIAGNOSIS — Z96.641 S/P HIP REPLACEMENT, RIGHT: Primary | ICD-10-CM

## 2023-12-01 LAB
BH CV LOW VAS RIGHT GASTRONEMIUS VESSEL: 1
BH CV LOW VAS RIGHT PERONEAL VESSEL: 1
BH CV LOW VAS RIGHT POPLITEAL SPONT: 1
BH CV LOW VAS RIGHT POSTERIOR TIBIAL VESSEL: 1
BH CV LOW VAS RIGHT PROXIMAL FEMORAL SPONT: 1
BH CV LOWER VASCULAR LEFT COMMON FEMORAL AUGMENT: NORMAL
BH CV LOWER VASCULAR LEFT COMMON FEMORAL COMPETENT: NORMAL
BH CV LOWER VASCULAR LEFT COMMON FEMORAL COMPRESS: NORMAL
BH CV LOWER VASCULAR LEFT COMMON FEMORAL PHASIC: NORMAL
BH CV LOWER VASCULAR LEFT COMMON FEMORAL SPONT: NORMAL
BH CV LOWER VASCULAR RIGHT COMMON FEMORAL AUGMENT: NORMAL
BH CV LOWER VASCULAR RIGHT COMMON FEMORAL COMPETENT: NORMAL
BH CV LOWER VASCULAR RIGHT COMMON FEMORAL COMPRESS: NORMAL
BH CV LOWER VASCULAR RIGHT COMMON FEMORAL PHASIC: NORMAL
BH CV LOWER VASCULAR RIGHT COMMON FEMORAL SPONT: NORMAL
BH CV LOWER VASCULAR RIGHT DISTAL FEMORAL COMPRESS: NORMAL
BH CV LOWER VASCULAR RIGHT GASTRONEMIUS COMPRESS: NORMAL
BH CV LOWER VASCULAR RIGHT GASTRONEMIUS THROMBUS: NORMAL
BH CV LOWER VASCULAR RIGHT GREATER SAPH AK COMPRESS: NORMAL
BH CV LOWER VASCULAR RIGHT GREATER SAPH BK COMPRESS: NORMAL
BH CV LOWER VASCULAR RIGHT LESSER SAPH COMPRESS: NORMAL
BH CV LOWER VASCULAR RIGHT MID FEMORAL AUGMENT: NORMAL
BH CV LOWER VASCULAR RIGHT MID FEMORAL COMPETENT: NORMAL
BH CV LOWER VASCULAR RIGHT MID FEMORAL COMPRESS: NORMAL
BH CV LOWER VASCULAR RIGHT MID FEMORAL PHASIC: NORMAL
BH CV LOWER VASCULAR RIGHT MID FEMORAL SPONT: NORMAL
BH CV LOWER VASCULAR RIGHT PERONEAL COMPRESS: NORMAL
BH CV LOWER VASCULAR RIGHT PERONEAL THROMBUS: NORMAL
BH CV LOWER VASCULAR RIGHT POPLITEAL AUGMENT: NORMAL
BH CV LOWER VASCULAR RIGHT POPLITEAL COMPETENT: NORMAL
BH CV LOWER VASCULAR RIGHT POPLITEAL COMPRESS: NORMAL
BH CV LOWER VASCULAR RIGHT POPLITEAL PHASIC: NORMAL
BH CV LOWER VASCULAR RIGHT POPLITEAL SPONT: NORMAL
BH CV LOWER VASCULAR RIGHT POPLITEAL THROMBUS: NORMAL
BH CV LOWER VASCULAR RIGHT POSTERIOR TIBIAL COMPRESS: NORMAL
BH CV LOWER VASCULAR RIGHT POSTERIOR TIBIAL THROMBUS: NORMAL
BH CV LOWER VASCULAR RIGHT PROXIMAL FEMORAL AUGMENT: NORMAL
BH CV LOWER VASCULAR RIGHT PROXIMAL FEMORAL COMPETENT: NORMAL
BH CV LOWER VASCULAR RIGHT PROXIMAL FEMORAL COMPRESS: NORMAL
BH CV LOWER VASCULAR RIGHT PROXIMAL FEMORAL PHASIC: NORMAL
BH CV LOWER VASCULAR RIGHT PROXIMAL FEMORAL SPONT: NORMAL
BH CV LOWER VASCULAR RIGHT PROXIMAL FEMORAL THROMBUS: NORMAL
BH CV LOWER VASCULAR RIGHT SAPHENOFEMORAL JUNCTION COMPRESS: NORMAL
BH CV VAS POP FLUID COLLECTED: 1
BH CV VAS PRELIMINARY FINDINGS SCRIPTING: 1

## 2023-12-01 PROCEDURE — 93971 EXTREMITY STUDY: CPT

## 2023-12-01 NOTE — PROGRESS NOTES
"Chief Complaint  Follow-up of the Right Hip    Subjective          History of Present Illness      Jeanne Dallas is a 88 y.o. female  presents to [facility] for     Patient presents with her daughter Georgia for follow-up evaluation of right hip hemiarthroplasty, 10/5/2023.  She saw Dr. Ambrose for 2-week postop visit.  She states the incision has been healing well, she denies drainage or swelling or redness.  She has been attending therapy at [facility] she states she stopped going since Thanksgiving.  She is happy with her progress she is weightbearing with a walker she is able to ambulate well without pain.  Patient main concern is right lower extremity swelling and calf pain.  She states that she has a history of DVT, she states that she has had swelling to the right lower extremity she has normally had bilateral lower extremity swelling but the right lower extremity has been more swollen lately.      No Known Allergies     Social History     Socioeconomic History    Marital status:    Tobacco Use    Smoking status: Never     Passive exposure: Past    Smokeless tobacco: Never   Vaping Use    Vaping Use: Never used   Substance and Sexual Activity    Alcohol use: Never    Drug use: Never    Sexual activity: Defer        REVIEW OF SYSTEMS    Constitutional: Awake alert and oriented x3, no acute distress, denies fevers, chills, weight loss  Respiratory: No respiratory distress  Vascular: Brisk cap refill, Intact distal pulses, No cyanosis, compartments soft with no signs or symptoms of compartment syndrome or DVT.   Cardiovascular: Denies chest pain, shortness of breath  Skin: Denies rashes, acute skin changes  Neurologic: Denies headache, loss of consciousness  MSK: Right lower extremity pain      Objective   Vital Signs:   BP (!) 194/77   Pulse 72   Ht 165.1 cm (65\")   Wt 66.2 kg (145 lb 15.1 oz)   SpO2 90%   BMI 24.29 kg/m²     Body mass index is 24.29 " kg/m².    Physical Exam       Right lower extremity: Well-healed incision, no erythema, no drainage or dehiscence, no fluctuance or signs of infection, active flexion 110, no pain with rotation, 5 out of 5 strength, leg lengths are equal, 1+ pitting edema to right lower extremity with tenderness to palpation of her calf region with positive Mehrdad testing,.      Procedures    Imaging Results (Most Recent)       None             Result Review :   The following data was reviewed by: RICHI Melo on 12/01/2023:               Assessment and Plan    Diagnoses and all orders for this visit:    1. S/P Right hip Hemiarthroplasty 10/5/23 (Primary)  -     Duplex Venous Lower Extremity - Right CAR; Future    2. Right calf pain  -     Duplex Venous Lower Extremity - Right CAR; Future        Discussed diagnosis and treatment options with the patient she was advised we will order stat ultrasound for further evaluation to rule out DVT patient and daughter agreed to this.  Patient and daughter were advised that I will call them with the results as soon as I obtain them, dependent on ultrasound results patient was advised if negative she will continue home exercises weightbearing as tolerated follow-up in 6 weeks for recheck with x-rays.  If positive ultrasound results patient was advised we will involve her primary care physician and treat her appropriately for this, patient and daughter agreed    Call or return if worsening symptoms.    Follow Up   Return in about 6 weeks (around 1/12/2024) for Recheck.  Patient was given instructions and counseling regarding her condition or for health maintenance advice. Please see specific information pulled into the AVS if appropriate.     Addendum: 2:22 PM, 12/1/2023, a little before 2:00 I received a phone call from the vascular lab from [facility] stating that the patient has positive right lower extremity DVT, lesion advised the patient of these results, she called to advise me  of these results.  I informed our nurse Magda that the patient is positive for DVT, she called in blood thinner medication for the patient and contacted the patient's primary care physician who will manage this for her.  Magda communicated with the patient's daughter Georgia regarding neck steps and patient daughter is clear of neck steps for her mother's care.  I attempted to call the patient daughter or end of the patient's telephone they did not answer.      EMR Dragon/Transcription disclaimer:  Much of this encounter note is an electronic transcription/translation of spoken language to printed text, aka voice recognition.  The electronic translation of spoken language may permit erroneous or at times nonsensical words or phrases to be inadvertently transcribed; although I have reviewed the note for such errors, some may still exist so please interpret based on surrounding text content.

## 2023-12-01 NOTE — TELEPHONE ENCOUNTER
PATIENT WAS SEEN BY DEN MAYA AND SENT FOR DOPSCAN.  DOPSCAN WAS POSITIVE.  PER SIMON MAYA SCRIP OF ELIQUIS WAS CALLED INTO [facility].  THE INSURANCE WOULD ONLY GIVE #60 ELIQUIS 5MG PILLS.  PATIENT TO TAKE 10MG BID DAILY UNTIL SEEN BY DR TRONCOSO, PATIENT'S PCP.  DR TRONCOSO'S OFFICE WAS CALLED AND PATIENT WAS SCHEDULED FOR APPOINTMENT ON DECEMBER 7TH AT 940AM.  I SPOKE TO JENNIFER AT DR BHATIA OFFICE.  PATIENT'S DAUGHTER, GEORGIA VOICES UNDERSTANDING WITH THE CARE PLAN

## 2023-12-04 ENCOUNTER — TELEPHONE (OUTPATIENT)
Dept: ORTHOPEDIC SURGERY | Facility: CLINIC | Age: 88
End: 2023-12-04
Payer: MEDICARE

## 2023-12-04 NOTE — TELEPHONE ENCOUNTER
I tried calling the patient and her daughter on Friday to review with him the ultrasound results, my nurse Magda already did so as well as the patient is already being followed up with and in contact with her primary care physician but I wanted to speak with him personally.  I just got off the phone with them at 8:04 AM I spoke with Georgia, she states that her mother is doing well they have her blood thinner medication prescription she is taking it, Georgia states that her mother has been doing well over the weekend, the patient has a visit with her primary care physician this Thursday morning, the next time I will see the patient will be in January on January 12 but the patient daughter was advised if any new or concerning symptoms occur they can call and be seen right away otherwise follow-up in visit on 1/12/2023    Duplex Venous Lower Extremity - Right CAR    Result Date: 12/1/2023  Narrative:   Acute right lower extremity deep vein thrombosis noted in the proximal femoral, popliteal, posterior tibial, peroneal and gastrocnemius.   Right popliteal fossa fluid collection.   All other right sided veins appeared normal.

## 2023-12-06 ENCOUNTER — HOSPITAL ENCOUNTER (EMERGENCY)
Facility: HOSPITAL | Age: 88
Discharge: HOME | End: 2023-12-06
Attending: EMERGENCY MEDICINE
Payer: MEDICARE

## 2023-12-06 ENCOUNTER — APPOINTMENT (OUTPATIENT)
Dept: CT IMAGING | Facility: HOSPITAL | Age: 88
End: 2023-12-06
Payer: MEDICARE

## 2023-12-06 VITALS
OXYGEN SATURATION: 93 % | TEMPERATURE: 99.1 F | BODY MASS INDEX: 23.46 KG/M2 | RESPIRATION RATE: 20 BRPM | HEART RATE: 77 BPM | SYSTOLIC BLOOD PRESSURE: 152 MMHG | WEIGHT: 145.94 LBS | DIASTOLIC BLOOD PRESSURE: 59 MMHG | HEIGHT: 66 IN

## 2023-12-06 DIAGNOSIS — M25.519 SHOULDER PAIN, UNSPECIFIED CHRONICITY, UNSPECIFIED LATERALITY: Primary | ICD-10-CM

## 2023-12-06 LAB
ALBUMIN SERPL-MCNC: 4.2 G/DL (ref 3.5–5.2)
ALBUMIN/GLOB SERPL: 1.7 G/DL
ALP SERPL-CCNC: 75 U/L (ref 39–117)
ALT SERPL W P-5'-P-CCNC: 7 U/L (ref 1–33)
ANION GAP SERPL CALCULATED.3IONS-SCNC: 11.5 MMOL/L (ref 5–15)
ANISOCYTOSIS BLD QL: NORMAL
AST SERPL-CCNC: 12 U/L (ref 1–32)
BASOPHILS # BLD AUTO: 0.03 10*3/MM3 (ref 0–0.2)
BASOPHILS NFR BLD AUTO: 0.2 % (ref 0–1.5)
BILIRUB SERPL-MCNC: 0.4 MG/DL (ref 0–1.2)
BUN SERPL-MCNC: 14 MG/DL (ref 8–23)
BUN/CREAT SERPL: 22.2 (ref 7–25)
CALCIUM SPEC-SCNC: 9.1 MG/DL (ref 8.6–10.5)
CHLORIDE SERPL-SCNC: 102 MMOL/L (ref 98–107)
CO2 SERPL-SCNC: 25.5 MMOL/L (ref 22–29)
CREAT SERPL-MCNC: 0.63 MG/DL (ref 0.57–1)
DEPRECATED RDW RBC AUTO: 47.3 FL (ref 37–54)
EGFRCR SERPLBLD CKD-EPI 2021: 85.4 ML/MIN/1.73
EOSINOPHIL # BLD AUTO: 0.02 10*3/MM3 (ref 0–0.4)
EOSINOPHIL NFR BLD AUTO: 0.1 % (ref 0.3–6.2)
ERYTHROCYTE [DISTWIDTH] IN BLOOD BY AUTOMATED COUNT: 14.6 % (ref 12.3–15.4)
FLUAV SUBTYP SPEC NAA+PROBE: NOT DETECTED
FLUBV RNA ISLT QL NAA+PROBE: NOT DETECTED
GLOBULIN UR ELPH-MCNC: 2.5 GM/DL
GLUCOSE SERPL-MCNC: 96 MG/DL (ref 65–99)
HCT VFR BLD AUTO: 34.5 % (ref 34–46.6)
HGB BLD-MCNC: 11.1 G/DL (ref 12–15.9)
IMM GRANULOCYTES # BLD AUTO: 0.04 10*3/MM3 (ref 0–0.05)
IMM GRANULOCYTES NFR BLD AUTO: 0.3 % (ref 0–0.5)
LYMPHOCYTES # BLD AUTO: 1.03 10*3/MM3 (ref 0.7–3.1)
LYMPHOCYTES NFR BLD AUTO: 7.2 % (ref 19.6–45.3)
MACROCYTES BLD QL SMEAR: NORMAL
MCH RBC QN AUTO: 28.5 PG (ref 26.6–33)
MCHC RBC AUTO-ENTMCNC: 32.2 G/DL (ref 31.5–35.7)
MCV RBC AUTO: 88.5 FL (ref 79–97)
MONOCYTES # BLD AUTO: 4.34 10*3/MM3 (ref 0.1–0.9)
MONOCYTES NFR BLD AUTO: 30.4 % (ref 5–12)
NEUTROPHILS NFR BLD AUTO: 61.8 % (ref 42.7–76)
NEUTROPHILS NFR BLD AUTO: 8.83 10*3/MM3 (ref 1.7–7)
NRBC BLD AUTO-RTO: 0 /100 WBC (ref 0–0.2)
NT-PROBNP SERPL-MCNC: 509.2 PG/ML (ref 0–1800)
OVALOCYTES BLD QL SMEAR: NORMAL
PLAT MORPH BLD: NORMAL
PLATELET # BLD AUTO: 138 10*3/MM3 (ref 140–450)
PMV BLD AUTO: 12.7 FL (ref 6–12)
POIKILOCYTOSIS BLD QL SMEAR: NORMAL
POTASSIUM SERPL-SCNC: 4 MMOL/L (ref 3.5–5.2)
PROT SERPL-MCNC: 6.7 G/DL (ref 6–8.5)
RBC # BLD AUTO: 3.9 10*6/MM3 (ref 3.77–5.28)
RSV RNA NPH QL NAA+NON-PROBE: NOT DETECTED
SARS-COV-2 RNA RESP QL NAA+PROBE: NOT DETECTED
SODIUM SERPL-SCNC: 139 MMOL/L (ref 136–145)
TROPONIN T SERPL HS-MCNC: 15 NG/L
WBC MORPH BLD: NORMAL
WBC NRBC COR # BLD AUTO: 14.29 10*3/MM3 (ref 3.4–10.8)

## 2023-12-06 PROCEDURE — 71260 CT THORAX DX C+: CPT

## 2023-12-06 PROCEDURE — 36415 COLL VENOUS BLD VENIPUNCTURE: CPT

## 2023-12-06 PROCEDURE — 85025 COMPLETE CBC W/AUTO DIFF WBC: CPT | Performed by: PHYSICIAN ASSISTANT

## 2023-12-06 PROCEDURE — 83880 ASSAY OF NATRIURETIC PEPTIDE: CPT | Performed by: PHYSICIAN ASSISTANT

## 2023-12-06 PROCEDURE — 87637 SARSCOV2&INF A&B&RSV AMP PRB: CPT | Performed by: PHYSICIAN ASSISTANT

## 2023-12-06 PROCEDURE — 80053 COMPREHEN METABOLIC PANEL: CPT | Performed by: PHYSICIAN ASSISTANT

## 2023-12-06 PROCEDURE — 93005 ELECTROCARDIOGRAM TRACING: CPT | Performed by: PHYSICIAN ASSISTANT

## 2023-12-06 PROCEDURE — 84484 ASSAY OF TROPONIN QUANT: CPT | Performed by: PHYSICIAN ASSISTANT

## 2023-12-06 PROCEDURE — 25510000001 IOPAMIDOL PER 1 ML: Performed by: EMERGENCY MEDICINE

## 2023-12-06 PROCEDURE — 99285 EMERGENCY DEPT VISIT HI MDM: CPT

## 2023-12-06 PROCEDURE — 85007 BL SMEAR W/DIFF WBC COUNT: CPT | Performed by: PHYSICIAN ASSISTANT

## 2023-12-06 RX ORDER — SODIUM CHLORIDE 0.9 % (FLUSH) 0.9 %
10 SYRINGE (ML) INJECTION AS NEEDED
Status: DISCONTINUED | OUTPATIENT
Start: 2023-12-06 | End: 2023-12-06 | Stop reason: HOSPADM

## 2023-12-06 RX ADMIN — IOPAMIDOL 47 ML: 755 INJECTION, SOLUTION INTRAVENOUS at 18:21

## 2023-12-06 NOTE — ED PROVIDER NOTES
Time: 4:59 PM EST  Date of encounter:  12/6/2023  Independent Historian/Clinical History and Information was obtained by:   Patient and Family    History is limited by: N/A    Chief Complaint   Patient presents with    Shoulder Pain    DVT         History of Present Illness:  Patient is a 88 y.o. year old female who presents to the emergency department for evaluation of concern for PE. Now having pain under right shoulder blade/lung worse with movement and deep breathing. Diagnosed with DVT in leg on Friday. Started on elliquis. Denies cough but does endorse chills. Wears 2L NC PRN. Was advised if she ever had pain like this she needed to come to the ED and get evaluated for a PE. (Shruthi Herrera PA-C provider in triage 5:02 PM EST )     Patient Care Team  Primary Care Provider: Sourav Gonzalez MD    Past Medical History:     No Known Allergies  Past Medical History:   Diagnosis Date    Asthma     Congenital heart disease     Disease of thyroid gland     Hypertension      Past Surgical History:   Procedure Laterality Date    CARDIAC CATHETERIZATION      HIP HEMIARTHROPLASTY Right 10/5/2023    Procedure: HIP HEMIARTHROPLASTY ANTERIOR;  Surgeon: Emmett Ambrose MD;  Location: [facility] MAIN OR;  Service: Orthopedics;  Laterality: Right;     Family History   Problem Relation Age of Onset    Heart attack Mother     Heart failure Mother     Stroke Father        Home Medications:  Prior to Admission medications    Medication Sig Start Date End Date Taking? Authorizing Provider   albuterol sulfate  (90 Base) MCG/ACT inhaler Inhale 2 puffs Every 4 (Four) Hours As Needed for Wheezing. 4/19/23   Myla Hayden APRN   apixaban (ELIQUIS) 2.5 MG tablet tablet Take 1 tablet by mouth Every 12 (Twelve) Hours. Indications: Prevention of Unwanted Clot in Veins 10/6/23   Michelle Maxwell MD   apixaban (Eliquis) 5 MG tablet tablet take 2 tablets by mouth 2 times a day for 7 days and then take 1 tablet by mouth 2 times  a day 12/1/23      aspirin 81 MG EC tablet Take 1 tablet by mouth Daily.    Jose Luis Mares MD   docusate sodium 100 MG capsule Take 1 capsule by mouth 2 (Two) Times a Day As Needed for Constipation. 10/6/23   Michelle Maxwell MD   estradiol (ESTRACE) 0.1 MG/GM vaginal cream Insert 1 applicator into the vagina 3 (Three) Times a Day. Apply cream to vagina area 2-3 times per week 11/21/22   Jose Luis Mares MD   Jardiance 10 MG tablet tablet Take 1 tablet by mouth Daily. 8/18/22   Jose Luis Mares MD   lisinopril (PRINIVIL,ZESTRIL) 10 MG tablet Take 1 tablet by mouth 2 (Two) Times a Day. 2/21/23   IFTIKHAR Yee MD   metoprolol tartrate (LOPRESSOR) 25 MG tablet Take 0.5 tablets by mouth 2 (Two) Times a Day. 10/7/22   Jose Luis Mares MD   Multiple Vitamins-Minerals (PRESERVISION AREDS 2 PO) Take  by mouth Daily.    Jose Luis Mares MD   omeprazole (priLOSEC) 20 MG capsule Take 1 capsule by mouth Daily. 10/7/22   Jose Luis Mares MD   spironolactone (ALDACTONE) 25 MG tablet Take 0.5 tablets by mouth Every Morning. 9/30/22   Jose Luis Mares MD   Vibegron 75 MG tablet Take 1 tablet by mouth Daily. 5/16/23   Jose Luis Mares MD        Social History:   Social History     Tobacco Use    Smoking status: Never     Passive exposure: Past    Smokeless tobacco: Never   Vaping Use    Vaping Use: Never used   Substance Use Topics    Alcohol use: Never    Drug use: Never         Review of Systems:  Review of Systems   Constitutional:  Positive for chills. Negative for fever.   HENT:  Negative for congestion, rhinorrhea and sore throat.    Eyes:  Negative for pain and visual disturbance.   Respiratory:  Negative for apnea, cough, chest tightness and shortness of breath.    Cardiovascular:  Positive for chest pain. Negative for palpitations.   Gastrointestinal:  Negative for abdominal pain, diarrhea, nausea and vomiting.   Genitourinary:  Negative for difficulty urinating and dysuria.  "  Musculoskeletal:  Negative for joint swelling and myalgias.   Skin:  Negative for color change.   Neurological:  Negative for seizures and headaches.   Psychiatric/Behavioral: Negative.     All other systems reviewed and are negative.       Physical Exam:  /59 (BP Location: Left arm, Patient Position: Sitting)   Pulse 77   Temp 99.1 °F (37.3 °C) (Oral)   Resp 20   Ht 167.6 cm (66\")   Wt 66.2 kg (145 lb 15.1 oz)   SpO2 93%   BMI 23.56 kg/m²         Physical Exam  Vitals and nursing note reviewed.   Constitutional:       General: She is not in acute distress.     Appearance: Normal appearance. She is not toxic-appearing.   HENT:      Head: Normocephalic and atraumatic.      Jaw: There is normal jaw occlusion.      Mouth/Throat:      Mouth: Mucous membranes are moist.   Eyes:      General: Lids are normal.      Extraocular Movements: Extraocular movements intact.      Conjunctiva/sclera: Conjunctivae normal.      Pupils: Pupils are equal, round, and reactive to light.   Cardiovascular:      Rate and Rhythm: Normal rate and regular rhythm.      Pulses: Normal pulses.      Heart sounds: Normal heart sounds.   Pulmonary:      Effort: Pulmonary effort is normal. No respiratory distress.      Breath sounds: Normal breath sounds. No wheezing or rhonchi.   Abdominal:      General: Abdomen is flat. There is no distension.      Palpations: Abdomen is soft.      Tenderness: There is no abdominal tenderness. There is no guarding or rebound.   Musculoskeletal:         General: Normal range of motion.      Cervical back: Normal range of motion and neck supple.      Right lower leg: No edema.      Left lower leg: No edema.   Skin:     General: Skin is warm and dry.   Neurological:      General: No focal deficit present.      Mental Status: She is alert and oriented to person, place, and time. Mental status is at baseline.   Psychiatric:         Mood and Affect: Mood normal.         Behavior: Behavior normal.      "                 Procedures:  Procedures      Medical Decision Making:      Comorbidities that affect care:    Recent DVT hip fracture, COPD    External Notes reviewed:    Previous Clinic Note: Orthopedic surgery office visit for right hip hemiarthroplasty follow-up      The following orders were placed and all results were independently analyzed by me:  Orders Placed This Encounter   Procedures    COVID-19, FLU A/B, RSV PCR 1 HR TAT - Swab, Nasopharynx    CT Chest With Contrast Diagnostic    Comprehensive Metabolic Panel    BNP    Single High Sensitivity Troponin T    CBC Auto Differential    Scan Slide    ECG 12 Lead Chest Pain    Insert Peripheral IV    CBC & Differential       Medications Given in the Emergency Department:  Medications   sodium chloride 0.9 % flush 10 mL (has no administration in time range)   iopamidol (ISOVUE-370) 76 % injection 100 mL (47 mL Intravenous Given 12/6/23 1821)        ED Course:    The patient was initially evaluated in the triage area where orders were placed. The patient was later dispositioned by Zach Livingston MD.      The patient was advised to stay for completion of workup which includes but is not limited to communication of labs and radiological results, reassessment and plan. The patient was advised that leaving prior to disposition by a provider could result in critical findings that are not communicated to the patient.          Labs:    Lab Results (last 24 hours)       Procedure Component Value Units Date/Time    CBC & Differential [[id number]]  (Abnormal) Collected: 12/06/23 1711    Specimen: Blood from Arm, Left Updated: 12/06/23 1830    Narrative:      The following orders were created for panel order CBC & Differential.  Procedure                               Abnormality         Status                     ---------                               -----------         ------                     CBC Auto Differential[[id number]]        Abnormal            Final result                Scan Slide[[id number]]                                       Final result                 Please view results for these tests on the individual orders.    Comprehensive Metabolic Panel [[id number]] Collected: 12/06/23 1711    Specimen: Blood from Arm, Left Updated: 12/06/23 1758     Glucose 96 mg/dL      BUN 14 mg/dL      Creatinine 0.63 mg/dL      Sodium 139 mmol/L      Potassium 4.0 mmol/L      Chloride 102 mmol/L      CO2 25.5 mmol/L      Calcium 9.1 mg/dL      Total Protein 6.7 g/dL      Albumin 4.2 g/dL      ALT (SGPT) 7 U/L      AST (SGOT) 12 U/L      Alkaline Phosphatase 75 U/L      Total Bilirubin 0.4 mg/dL      Globulin 2.5 gm/dL      A/G Ratio 1.7 g/dL      BUN/Creatinine Ratio 22.2     Anion Gap 11.5 mmol/L      eGFR 85.4 mL/min/1.73     Narrative:      GFR Normal >60  Chronic Kidney Disease <60  Kidney Failure <15    The GFR formula is only valid for adults with stable renal function between ages 18 and 70.    BNP [[id number]]  (Normal) Collected: 12/06/23 1711    Specimen: Blood from Arm, Left Updated: 12/06/23 1753     proBNP 509.2 pg/mL     Narrative:      This assay is used as an aid in the diagnosis of individuals suspected of having heart failure. It can be used as an aid in the diagnosis of acute decompensated heart failure (ADHF) in patients presenting with signs and symptoms of ADHF to the emergency department (ED). In addition, NT-proBNP of <300 pg/mL indicates ADHF is not likely.    Age Range Result Interpretation  NT-proBNP Concentration (pg/mL:      <50             Positive            >450                   Gray                 300-450                    Negative             <300    50-75           Positive            >900                  Gray                300-900                  Negative            <300      >75             Positive            >1800                  Gray                300-1800                  Negative            <300    Single High Sensitivity Troponin T  [[id number]]  (Abnormal) Collected: 12/06/23 1711    Specimen: Blood from Arm, Left Updated: 12/06/23 1758     HS Troponin T 15 ng/L     Narrative:      High Sensitive Troponin T Reference Range:  <14.0 ng/L- Negative Female for AMI  <22.0 ng/L- Negative Male for AMI  >=14 - Abnormal Female indicating possible myocardial injury.  >=22 - Abnormal Male indicating possible myocardial injury.   Clinicians would have to utilize clinical acumen, EKG, Troponin, and serial changes to determine if it is an Acute Myocardial Infarction or myocardial injury due to an underlying chronic condition.         CBC Auto Differential [[id number]]  (Abnormal) Collected: 12/06/23 1711    Specimen: Blood from Arm, Left Updated: 12/06/23 1830     WBC 14.29 10*3/mm3      RBC 3.90 10*6/mm3      Hemoglobin 11.1 g/dL      Hematocrit 34.5 %      MCV 88.5 fL      MCH 28.5 pg      MCHC 32.2 g/dL      RDW 14.6 %      RDW-SD 47.3 fl      MPV 12.7 fL      Platelets 138 10*3/mm3      Neutrophil % 61.8 %      Lymphocyte % 7.2 %      Monocyte % 30.4 %      Eosinophil % 0.1 %      Basophil % 0.2 %      Immature Grans % 0.3 %      Neutrophils, Absolute 8.83 10*3/mm3      Lymphocytes, Absolute 1.03 10*3/mm3      Monocytes, Absolute 4.34 10*3/mm3      Eosinophils, Absolute 0.02 10*3/mm3      Basophils, Absolute 0.03 10*3/mm3      Immature Grans, Absolute 0.04 10*3/mm3      nRBC 0.0 /100 WBC     Scan Slide [[id number]] Collected: 12/06/23 1711    Specimen: Blood from Arm, Left Updated: 12/06/23 1830     Anisocytosis Slight/1+     Macrocytes Slight/1+     Ovalocytes Slight/1+     Poikilocytes Slight/1+     WBC Morphology Normal     Platelet Morphology Normal    COVID-19, FLU A/B, RSV PCR 1 HR TAT - Swab, Nasopharynx [[id number]]  (Normal) Collected: 12/06/23 1712    Specimen: Swab from Nasopharynx Updated: 12/06/23 1817     COVID19 Not Detected     Influenza A PCR Not Detected     Influenza B PCR Not Detected     RSV, PCR Not Detected    Narrative:      Fact  sheet for providers: https://www.fda.gov/media/324083/download    Fact sheet for patients: https://www.fda.gov/media/920196/download    Test performed by [facility].             Imaging:    CT Chest With Contrast Diagnostic    Result Date: 12/6/2023  PROCEDURE: CT CHEST W CONTRAST DIAGNOSTIC  COMPARISON:  [facility], CT, CT CHEST WO CONTRAST DIAGNOSTIC, 4/10/2023, 10:10.  [facility], CT, CT CHEST WO CONTRAST DIAGNOSTIC, 7/18/2023, 9:45.  INDICATIONS: PE protocol  PROTOCOL:   PE imaging protocol performed    RADIATION:   DLP: 263.2 mGy*cm   Automated exposure control was utilized to minimize radiation dose. CONTRAST: 47cc Isovue 370 I.V.  TECHNIQUE: Axial images of the chest with intravenous contrast.  FINDINGS: No pulmonary emboli are identified.  The thoracic aorta has a normal caliber.  Coronary artery calcification is present.  No evidence of pleural or pericardial effusion.  There are areas of scarring in the mid and lower lung fields with bronchiectasis.  There is a 6.8 cm cyst posteriorly in the right upper lobe with eccentric wall thickening.  The wall thickening has increased.  There is no mediastinal, axillary or hilar adenopathy.  There is a small hiatal hernia.  Images of the upper abdomen are unremarkable.  Degenerative changes are present in the thoracic spine.  Anterior right 5th and 6th rib fractures.  IMPRESSION:  1. No pulmonary emboli are identified.  2. 6.8 cm cyst posteriorly in the right upper lobe increasing eccentric wall thickening.  Recommend a PET-CT scan to exclude neoplasm.  3. Anterior right 5th and 6th rib fractures appear subacute in nature.   NIHARIKA GALICIA MD       Electronically Signed and Approved By: NIHARIKA GALICIA MD on 12/06/2023 at 18:34                Differential Diagnosis and Discussion:      Extremity Pain: Differential diagnosis includes but is not limited to soft tissue sprain, tendonitis, tendon injury, dislocation, fracture, deep vein  thrombosis, arterial insufficiency, osteoarthritis, bursitis, and ligamentous damage.    All labs were reviewed and interpreted by me.  CT scan radiology impression was interpreted by me.    MDM  Number of Diagnoses or Management Options  Shoulder pain, unspecified chronicity, unspecified laterality  Diagnosis management comments: In summary this is an 88-year-old female patient presents to the emergency department for evaluation of shoulder pain in the setting of currently being treated for an acute DVT.  CT of the chest with IV contrast is negative for PE.  CBC independently reviewed by me and shows no critical abnormalities.  CMP independently reviewed by me and shows no critical abnormalities.  Patient is otherwise well-appearing and in no acute distress.  Very strict return to ER and follow-up instructions have been provided to the patient.         Amount and/or Complexity of Data Reviewed  Clinical lab tests: reviewed  Tests in the radiology section of CPT®: reviewed  Tests in the medicine section of CPT®: reviewed                 Patient Care Considerations:    NARCOTICS: I considered prescribing opiate pain medication as an outpatient, however no narcotic requiring condition identified      Consultants/Shared Management Plan:    None    Social Determinants of Health:    Patient is independent, reliable, and has access to care.       Disposition and Care Coordination:    Discharged: The patient is suitable and stable for discharge with no need for consideration of observation or admission.    I have explained the patient´s condition, diagnoses and treatment plan based on the information available to me at this time. I have answered questions and addressed any concerns. The patient has a good  understanding of the patient´s diagnosis, condition, and treatment plan as can be expected at this point. The vital signs have been stable. The patient´s condition is stable and appropriate for discharge from the  emergency department.      The patient will pursue further outpatient evaluation with the primary care physician or other designated or consulting physician as outlined in the discharge instructions. They are agreeable to this plan of care and follow-up instructions have been explained in detail. The patient has received these instructions in written format and have expressed an understanding of the discharge instructions. The patient is aware that any significant change in condition or worsening of symptoms should prompt an immediate return to this or the closest emergency department or call to 911.  I have explained discharge medications and the need for follow up with the patient/caretakers. This was also printed in the discharge instructions. Patient was discharged with the following medications and follow up:      Medication List      No changes were made to your prescriptions during this visit.      Sourav Gonzalez MD  [address] [place] [address] [address] [place] [address] [phone]    In 1 week         Final diagnoses:   Shoulder pain, unspecified chronicity, unspecified laterality        ED Disposition       ED Disposition   Discharge    Condition   Stable    Comment   --               This medical record created using voice recognition software.             Zach Livingston MD  12/06/23 [id number]

## 2023-12-10 LAB
QT INTERVAL: 384 MS
QTC INTERVAL: 433 MS

## 2024-01-12 ENCOUNTER — OFFICE VISIT (OUTPATIENT)
Dept: ORTHOPEDIC SURGERY | Facility: CLINIC | Age: 89
End: 2024-01-12
Payer: MEDICARE

## 2024-01-12 VITALS
DIASTOLIC BLOOD PRESSURE: 67 MMHG | HEIGHT: 66 IN | OXYGEN SATURATION: 90 % | SYSTOLIC BLOOD PRESSURE: 189 MMHG | BODY MASS INDEX: 23.46 KG/M2 | HEART RATE: 75 BPM | WEIGHT: 145.94 LBS

## 2024-01-12 DIAGNOSIS — M25.551 RIGHT HIP PAIN: ICD-10-CM

## 2024-01-12 DIAGNOSIS — Z96.641 S/P HIP REPLACEMENT, RIGHT: Primary | ICD-10-CM

## 2024-01-12 NOTE — PROGRESS NOTES
"Chief Complaint  Pain and Follow-up of the Right Hip    Subjective          History of Present Illness      Jeanne Dallas is a 88 y.o. female  presents to [facility] for     Patient presents for follow-up evaluation of right hip hemiarthroplasty she is here with her daughter Georgia.  At last visit patient was complaining of calf pain we ordered ultrasound which revealed blood clot she is being monitored for blood clot by her primary care physician.  She states her hip is \"doing pretty good \".  She admits to a burning pain occasionally but states her calf pain has improved.  She is taking blood thinner medications.  Patient states her incision has healed well.  She is walking and with a walker she was attending therapy but got sick and has had to miss some her primary care physician will restart her on therapy once she feels better.  Patient daughter and patient states they are happy with her progress, no new complaints of the hip      No Known Allergies     Social History     Socioeconomic History    Marital status:    Tobacco Use    Smoking status: Never     Passive exposure: Past    Smokeless tobacco: Never   Vaping Use    Vaping Use: Never used   Substance and Sexual Activity    Alcohol use: Never    Drug use: Never    Sexual activity: Defer        REVIEW OF SYSTEMS    Constitutional: Awake alert and oriented x3, no acute distress, denies fevers, chills, weight loss  Respiratory: No respiratory distress  Vascular: Brisk cap refill, Intact distal pulses, No cyanosis, compartments soft with no signs or symptoms of compartment syndrome or DVT.   Cardiovascular: Denies chest pain, shortness of breath  Skin: Denies rashes, acute skin changes  Neurologic: Denies headache, loss of consciousness  MSK: Right hip pain      Objective   Vital Signs:   BP (!) 189/67   Pulse 75   Ht 167.6 cm (66\")   Wt 66.2 kg (145 lb 15.1 oz)   SpO2 90%   BMI 23.56 kg/m²     Body mass index is " 23.56 kg/m².    Physical Exam       Right hip: Incision is healed well, no erythema ecchymosis swelling or signs of infection active flexion 120 no pain with rotation, no pain with resisted range of motion, strength appropriate, leg lengths are equal      Procedures    Imaging Results (Most Recent)       Procedure Component Value Units Date/Time    XR Hip With or Without Pelvis 2 - 3 View Right [[id number]] Resulted: 01/12/24 1129     Updated: 01/12/24 1129    Narrative:      View:AP/Lateral view(s)  Site: Right hip  Indication: Right hip pain  Study: X-rays ordered, taken in the office, and reviewed today  X-ray: Intact appearing right hip hemiarthroplasty, no signs of hardware   failure or loosening, no subsidence or periprosthetic fracture, good   alignment  Comparative data: Previous studies             Result Review :   The following data was reviewed by: RICHI Melo on 01/12/2024:  Data reviewed : Radiologic studies reviewed by me with the patient and her family              Assessment and Plan    Diagnoses and all orders for this visit:    1. S/P Right hip Hemiarthroplasty 10/5/23 (Primary)    2. Right hip pain  -     XR Hip With or Without Pelvis 2 - 3 View Right        Reviewed x-rays with the patient and her family discussed diagnosis and treatment options with them patient and family were advised she can continue therapy when she is ready, continue ambulation weightbearing as tolerated follow-up in 6 weeks.  EXTR at next visit    Call or return if worsening symptoms.    Follow Up   Return in about 6 weeks (around 2/23/2024) for Recheck.  Patient was given instructions and counseling regarding her condition or for health maintenance advice. Please see specific information pulled into the AVS if appropriate.       EMR Dragon/Transcription disclaimer:  Much of this encounter note is an electronic transcription/translation of spoken language to printed text, aka voice recognition.  The  electronic translation of spoken language may permit erroneous or at times nonsensical words or phrases to be inadvertently transcribed; although I have reviewed the note for such errors, some may still exist so please interpret based on surrounding text content.

## 2024-01-26 ENCOUNTER — HOSPITAL ENCOUNTER (OUTPATIENT)
Dept: CT IMAGING | Facility: HOSPITAL | Age: 89
Discharge: HOME | End: 2024-01-26
Admitting: NURSE PRACTITIONER
Payer: MEDICARE

## 2024-01-26 DIAGNOSIS — R93.89 ABNORMAL CT SCAN, CHEST: ICD-10-CM

## 2024-01-26 PROCEDURE — 71250 CT THORAX DX C-: CPT

## 2024-01-31 ENCOUNTER — OFFICE VISIT (OUTPATIENT)
Dept: PULMONOLOGY | Facility: CLINIC | Age: 89
End: 2024-01-31
Payer: MEDICARE

## 2024-01-31 VITALS
OXYGEN SATURATION: 96 % | DIASTOLIC BLOOD PRESSURE: 64 MMHG | SYSTOLIC BLOOD PRESSURE: 136 MMHG | WEIGHT: 142 LBS | HEART RATE: 64 BPM | BODY MASS INDEX: 22.82 KG/M2 | HEIGHT: 66 IN | RESPIRATION RATE: 16 BRPM | TEMPERATURE: 97.8 F

## 2024-01-31 DIAGNOSIS — J44.89 ASTHMA-COPD OVERLAP SYNDROME: Primary | ICD-10-CM

## 2024-01-31 DIAGNOSIS — I51.89 DIASTOLIC DYSFUNCTION: ICD-10-CM

## 2024-01-31 DIAGNOSIS — R06.09 CHRONIC DYSPNEA: ICD-10-CM

## 2024-01-31 DIAGNOSIS — I82.491 ACUTE DEEP VEIN THROMBOSIS (DVT) OF OTHER SPECIFIED VEIN OF RIGHT LOWER EXTREMITY: ICD-10-CM

## 2024-01-31 DIAGNOSIS — R05.2 SUBACUTE COUGH: ICD-10-CM

## 2024-01-31 RX ORDER — FLUTICASONE PROPIONATE AND SALMETEROL 250; 50 UG/1; UG/1
1 POWDER RESPIRATORY (INHALATION)
Qty: 60 EACH | Refills: 11 | Status: SHIPPED | OUTPATIENT
Start: 2024-01-31

## 2024-02-23 ENCOUNTER — OFFICE VISIT (OUTPATIENT)
Dept: ORTHOPEDIC SURGERY | Facility: CLINIC | Age: 89
End: 2024-02-23
Payer: MEDICARE

## 2024-02-23 VITALS
SYSTOLIC BLOOD PRESSURE: 177 MMHG | WEIGHT: 141.98 LBS | DIASTOLIC BLOOD PRESSURE: 68 MMHG | OXYGEN SATURATION: 91 % | BODY MASS INDEX: 22.82 KG/M2 | HEIGHT: 66 IN | HEART RATE: 68 BPM

## 2024-02-23 DIAGNOSIS — M25.551 RIGHT HIP PAIN: ICD-10-CM

## 2024-02-23 DIAGNOSIS — Z96.641 S/P HIP REPLACEMENT, RIGHT: Primary | ICD-10-CM

## 2024-02-23 NOTE — PROGRESS NOTES
"Chief Complaint  Follow-up and Pain of the Right Hip    Subjective          History of Present Illness      Jeanne Dallas is a 88 y.o. female  presents to [facility] for     Patient presents for follow-up evaluation of right hip hemiarthroplasty, 10/5/2023.  Patient and her daughter states she has been doing well.  Patient has finished therapy she states she is \"doing fine\".  She states sometimes she has some burning at the incision site otherwise she states she has good range of motion and strength.  She states she has balance issues which she uses the walker for otherwise no new complaints.  She denies need for pain medication or NSAIDs.      No Known Allergies     Social History     Socioeconomic History    Marital status:    Tobacco Use    Smoking status: Never     Passive exposure: Past    Smokeless tobacco: Never   Vaping Use    Vaping Use: Never used   Substance and Sexual Activity    Alcohol use: Never    Drug use: Never    Sexual activity: Defer        REVIEW OF SYSTEMS    Constitutional: Awake alert and oriented x3, no acute distress, denies fevers, chills, weight loss  Respiratory: No respiratory distress  Vascular: Brisk cap refill, Intact distal pulses, No cyanosis, compartments soft with no signs or symptoms of compartment syndrome or DVT.   Cardiovascular: Denies chest pain, shortness of breath  Skin: Denies rashes, acute skin changes  Neurologic: Denies headache, loss of consciousness  MSK: Right hip pain      Objective   Vital Signs:   /68   Pulse 68   Ht 167.6 cm (66\")   Wt 64.4 kg (141 lb 15.6 oz)   SpO2 91%   BMI 22.92 kg/m²     Body mass index is 22.92 kg/m².    Physical Exam       Right hip: Well-healed incision, no erythema, no ecchymosis, no fluctuance or signs of infection, active flexion 120 no pain with rotation, 5 out of 5 strength, leg lengths equal patient ambulates with nonantalgic gait, nontender calf, negative Mehrdad " testing      Procedures    Imaging Results (Most Recent)       Procedure Component Value Units Date/Time    XR Hip With or Without Pelvis 2 - 3 View Right [[id number]] Resulted: 02/23/24 1027     Updated: 02/23/24 1028    Narrative:      View:AP/Lateral view(s)  Site: Right hip  Indication: Hip pain  Study: X-rays ordered, taken in the office, and reviewed today  X-ray: Intact appearing right hip hemiarthroplasty, no signs of hardware   failure or loosening, no subsidence or periprosthetic fracture, good   alignment  Comparative data: Previous studies             Result Review :   The following data was reviewed by: RICHI Melo on 02/23/2024:  Data reviewed : Radiologic studies reviewed by me with the patient and her daughter              Assessment and Plan    Diagnoses and all orders for this visit:    1. S/P Right hip Hemiarthroplasty 10/5/23 (Primary)    2. Right hip pain  -     XR Hip With or Without Pelvis 2 - 3 View Right        Reviewed x-rays with the patient and her daughter discussed diagnosis and treatment options with them patient and daughter were advised she can continue weightbearing and activity as tolerated follow-up as needed    Call or return if worsening symptoms.    Follow Up   Return if symptoms worsen or fail to improve.  Patient was given instructions and counseling regarding her condition or for health maintenance advice. Please see specific information pulled into the AVS if appropriate.       EMR Dragon/Transcription disclaimer:  Much of this encounter note is an electronic transcription/translation of spoken language to printed text, aka voice recognition.  The electronic translation of spoken language may permit erroneous or at times nonsensical words or phrases to be inadvertently transcribed; although I have reviewed the note for such errors, some may still exist so please interpret based on surrounding text content.

## 2024-07-02 ENCOUNTER — OFFICE VISIT (OUTPATIENT)
Dept: CARDIOLOGY | Facility: CLINIC | Age: 89
End: 2024-07-02
Payer: MEDICARE

## 2024-07-02 VITALS
SYSTOLIC BLOOD PRESSURE: 186 MMHG | DIASTOLIC BLOOD PRESSURE: 74 MMHG | WEIGHT: 143 LBS | HEIGHT: 66 IN | BODY MASS INDEX: 22.98 KG/M2 | HEART RATE: 57 BPM

## 2024-07-02 DIAGNOSIS — I82.4Z9 DEEP VEIN THROMBOSIS (DVT) OF DISTAL VEIN OF LOWER EXTREMITY, UNSPECIFIED CHRONICITY, UNSPECIFIED LATERALITY: ICD-10-CM

## 2024-07-02 DIAGNOSIS — R09.89 LABILE HYPERTENSION: Primary | ICD-10-CM

## 2024-07-02 DIAGNOSIS — I51.89 DIASTOLIC DYSFUNCTION: ICD-10-CM

## 2024-07-02 PROCEDURE — G2211 COMPLEX E/M VISIT ADD ON: HCPCS | Performed by: NURSE PRACTITIONER

## 2024-07-02 PROCEDURE — 1159F MED LIST DOCD IN RCRD: CPT | Performed by: NURSE PRACTITIONER

## 2024-07-02 PROCEDURE — 1160F RVW MEDS BY RX/DR IN RCRD: CPT | Performed by: NURSE PRACTITIONER

## 2024-07-02 PROCEDURE — 99214 OFFICE O/P EST MOD 30 MIN: CPT | Performed by: NURSE PRACTITIONER

## 2024-07-02 NOTE — PROGRESS NOTES
Chief Complaint  Shortness of Breath    Subjective            Jeanne Dallas presents to [facility]  History of Present Illness    Jeanne is here for follow-up evaluation management of labile hypertension.  Since her last visit she was diagnosed with DVT and is on anticoagulation.  No bleeding problems.  Her blood pressure is overall well-controlled at home with some occasional low readings but no high readings like today in the office.  She reports her blood pressure is always high in the doctor office.  She denies chest pain excessive shortness of breath, or palpitations.    PMH  Past Medical History:   Diagnosis Date    Asthma     Congenital heart disease     Disease of thyroid gland     Hypertension          SURGICALHX  Past Surgical History:   Procedure Laterality Date    CARDIAC CATHETERIZATION      HIP HEMIARTHROPLASTY Right 10/5/2023    Procedure: HIP HEMIARTHROPLASTY ANTERIOR;  Surgeon: Emmett Ambrose MD;  Location: [facility] OR;  Service: Orthopedics;  Laterality: Right;        SOC  Social History     Socioeconomic History    Marital status:    Tobacco Use    Smoking status: Never     Passive exposure: Past    Smokeless tobacco: Never   Vaping Use    Vaping status: Never Used   Substance and Sexual Activity    Alcohol use: Never    Drug use: Never    Sexual activity: Defer         FAMHX  Family History   Problem Relation Age of Onset    Heart attack Mother     Heart failure Mother     Stroke Father           ALLERGY  No Known Allergies     MEDSCURRENT    Current Outpatient Medications:     albuterol sulfate  (90 Base) MCG/ACT inhaler, Inhale 2 puffs Every 4 (Four) Hours As Needed for Wheezing., Disp: 18 g, Rfl: 11    apixaban (Eliquis) 5 MG tablet tablet, take 2 tablets by mouth 2 times a day for 7 days and then take 1 tablet by mouth 2 times a day, Disp: 74 tablet, Rfl: 0    aspirin 81 MG EC tablet, Take 1 tablet by mouth Daily., Disp: , Rfl:      "docusate sodium 100 MG capsule, Take 1 capsule by mouth 2 (Two) Times a Day As Needed for Constipation., Disp: , Rfl:     estradiol (ESTRACE) 0.1 MG/GM vaginal cream, Insert 1 g into the vagina 3 (Three) Times a Day. Apply cream to vagina area 2-3 times per week, Disp: , Rfl:     Fluticasone-Salmeterol (ADVAIR/WIXELA) 250-50 MCG/ACT DISKUS, Inhale 1 puff 2 (Two) Times a Day., Disp: 60 each, Rfl: 11    lisinopril (PRINIVIL,ZESTRIL) 10 MG tablet, Take 1 tablet by mouth 2 (Two) Times a Day., Disp: 180 tablet, Rfl: 3    metoprolol tartrate (LOPRESSOR) 25 MG tablet, Take 0.5 tablets by mouth 2 (Two) Times a Day., Disp: , Rfl:     Multiple Vitamins-Minerals (PRESERVISION AREDS 2 PO), Take  by mouth Daily., Disp: , Rfl:     omeprazole (priLOSEC) 20 MG capsule, Take 1 capsule by mouth Daily., Disp: , Rfl:     spironolactone (ALDACTONE) 25 MG tablet, Take 0.5 tablets by mouth Every Morning., Disp: , Rfl:     Vibegron 75 MG tablet, Take 1 tablet by mouth Daily., Disp: , Rfl:     Jardiance 10 MG tablet tablet, Take 1 tablet by mouth Daily. (Patient not taking: Reported on 7/2/2024), Disp: , Rfl:       Review of Systems   Cardiovascular:  Negative for chest pain, dyspnea on exertion and palpitations.        Objective     BP (!) 186/74   Pulse 57   Ht 167.6 cm (65.98\")   Wt 64.9 kg (143 lb)   BMI 23.09 kg/m²       General Appearance:   well developed  well nourished  HENT:   oropharynx moist  lips not cyanotic  Neck:  thyroid not enlarged  supple  Respiratory:  no respiratory distress  normal breath sounds  no rales  Cardiovascular:  no jugular venous distention  regular rhythm  apical impulse normal  S1 normal, S2 normal  no S3, no S4   no murmur  no rub, no thrill  carotid pulses normal; no bruit  pedal pulses normal  lower extremity edema: none    Musculoskeletal:  no clubbing of fingers.   normocephalic, head atraumatic  Skin:   warm, dry  Psychiatric:  judgement and insight appropriate  normal mood and " affect      Result Review :     The following data was reviewed by: BRYANT Godinez on 07/02/2024:    CMP          10/9/2023    05:00 10/16/2023    04:00 12/6/2023    17:11   CMP   Glucose 108  98  96    BUN 22  10  14    Creatinine 0.57  0.45  0.63    EGFR 88.1  93.2  85.4    Sodium 134  139  139    Potassium 3.9  4.0  4.0    Chloride 98  103  102    Calcium 9.3  8.8  9.1    Total Protein 6.1  5.7  6.7    Albumin 3.1  3.1  4.2    Globulin 3.0  2.6  2.5    Total Bilirubin 0.7  0.4  0.4    Alkaline Phosphatase 88  73  75    AST (SGOT) 21  19  12    ALT (SGPT) 13  20  7    Albumin/Globulin Ratio 1.0  1.2  1.7    BUN/Creatinine Ratio 38.6  22.2  22.2    Anion Gap 9.4  8.7  11.5      CBC          10/9/2023    05:00 10/16/2023    04:00 12/6/2023    17:11   CBC   WBC 13.01  9.40  14.29    RBC 3.64  3.30  3.90    Hemoglobin 10.6  9.5  11.1    Hematocrit 33.4  31.0  34.5    MCV 91.8  93.9  88.5    MCH 29.1  28.8  28.5    MCHC 31.7  30.6  32.2    RDW 14.9  14.5  14.6    Platelets 119  187  138            Data reviewed : Primary care notes reviewed.  Last echocardiogram showed normal LV systolic function with diastolic dysfunction and mild mitral regurgitation.    Procedures      Jeanne Dallas  reports that she has never smoked. She has been exposed to tobacco smoke. She has never used smokeless tobacco. I have educated her on the risk of diseases from using tobacco products such as cancer, COPD, and heart disease.              Assessment and Plan        ASSESSMENT:  Encounter Diagnoses   Name Primary?    Labile hypertension Yes    Diastolic dysfunction     Deep vein thrombosis (DVT) of distal vein of lower extremity, unspecified chronicity, unspecified laterality          PLAN:    1.  Labile hypertension-elevated today.  She checks her blood pressure daily at home and it is always normal to low normal.  Continue current medical therapy.  2.  Chronic diastolic dysfunction-volume status is stable.  Continue  current medical therapy.  3.  Previous DVT on Eliquis, continue.    Follow-up annually.      Patient was given instructions and counseling regarding her condition or for health maintenance advice. Please see specific information pulled into the AVS if appropriate.           Laila Bowman, APRN   7/2/2024  09:24 EDT

## 2024-07-30 NOTE — PROGRESS NOTES
Primary Care Provider  Sourav Gonzalez MD     Referring Provider  No ref. provider found     Chief Complaint  Asthma, COPD, and Follow-up (6 month f/up )    Subjective          Jeanne Dallas presents to [facility]  History of Present Illness  Jeanne Dallas is a 88 y.o. female patient of Dr. Solorio here for management diastolic dysfunction, asthma/COPD overlap syndrome and dyspnea on exertion.     Patient states she is doing okay since her last office visit.  She denies using any antibiotics or steroids for her lungs.  She denies any current fevers or chills.  Her shortness of breath is mild in severity, worse with exertion and improved with rest.  She continues to use Advair as prescribed.  She states that she does not currently have an albuterol inhaler but does not feel that she needs one.  Continues to see cardiology for management of diastolic dysfunction.  Overall, she states that she is doing well and has no additional concerns at this time. She is able to perform her ADLs with minor modifications.  She does use the assistance of a rolling walker to help with mobility.  She is up-to-date with her COVID but declines other respiratory vaccinations.     Her history of smoking is   Tobacco Use: Low Risk  (7/31/2024)    Patient History     Smoking Tobacco Use: Never     Smokeless Tobacco Use: Never     Passive Exposure: Past   .    Review of Systems   Constitutional:  Negative for chills, fatigue, fever, unexpected weight gain and unexpected weight loss.   HENT:  Congestion: Nasal.    Respiratory:  Positive for shortness of breath. Negative for apnea, cough and wheezing.         Negative for Hemoptysis     Cardiovascular:  Negative for chest pain, palpitations and leg swelling.   Skin:         Negative for cyanosis      Sleep: Negative for Excessive daytime sleepiness  Negative for morning headaches  Negative for Snoring    Family History    Problem Relation Age of Onset    Heart attack Mother     Heart failure Mother     Stroke Father         Social History     Socioeconomic History    Marital status:    Tobacco Use    Smoking status: Never     Passive exposure: Past    Smokeless tobacco: Never   Vaping Use    Vaping status: Never Used   Substance and Sexual Activity    Alcohol use: Never    Drug use: Never    Sexual activity: Defer        Past Medical History:   Diagnosis Date    Asthma     Congenital heart disease     Disease of thyroid gland     Hypertension         Immunization History   Administered Date(s) Administered    COVID-19 (JERONIMO) 03/08/2021, 11/19/2021, 07/15/2022    COVID-19 (MODERNA) BIVALENT 12+YRS 11/23/2022    Fluzone High Dose =>65 Years ([employer] ONLY) 09/30/2022, 10/15/2023         No Known Allergies       Current Outpatient Medications:     albuterol sulfate  (90 Base) MCG/ACT inhaler, Inhale 2 puffs Every 4 (Four) Hours As Needed for Wheezing., Disp: 18 g, Rfl: 11    apixaban (Eliquis) 5 MG tablet tablet, take 2 tablets by mouth 2 times a day for 7 days and then take 1 tablet by mouth 2 times a day, Disp: 74 tablet, Rfl: 0    aspirin 81 MG EC tablet, Take 1 tablet by mouth Daily., Disp: , Rfl:     docusate sodium 100 MG capsule, Take 1 capsule by mouth 2 (Two) Times a Day As Needed for Constipation., Disp: , Rfl:     estradiol (ESTRACE) 0.1 MG/GM vaginal cream, Insert 1 g into the vagina 3 (Three) Times a Day. Apply cream to vagina area 2-3 times per week, Disp: , Rfl:     lisinopril (PRINIVIL,ZESTRIL) 10 MG tablet, Take 1 tablet by mouth 2 (Two) Times a Day., Disp: 180 tablet, Rfl: 3    metoprolol tartrate (LOPRESSOR) 25 MG tablet, Take 0.5 tablets by mouth 2 (Two) Times a Day., Disp: , Rfl:     Multiple Vitamins-Minerals (PRESERVISION AREDS 2 PO), Take  by mouth Daily., Disp: , Rfl:     omeprazole (priLOSEC) 20 MG capsule, Take 1 capsule by mouth Daily., Disp: , Rfl:     spironolactone (ALDACTONE) 25 MG  "tablet, Take 0.5 tablets by mouth Every Morning., Disp: , Rfl:     Vibegron 75 MG tablet, Take 1 tablet by mouth Daily., Disp: , Rfl:     fluticasone-salmeterol (ADVAIR HFA) 115-21 MCG/ACT inhaler, Inhale 2 puffs 2 (Two) Times a Day., Disp: , Rfl:     Jardiance 10 MG tablet tablet, Take 1 tablet by mouth Daily. (Patient not taking: Reported on 7/2/2024), Disp: , Rfl:      Objective   Physical Exam  Constitutional:       General: She is not in acute distress.     Appearance: Normal appearance. She is normal weight.   HENT:      Right Ear: Hearing normal.      Left Ear: Hearing normal.      Nose: No nasal tenderness or congestion.      Mouth/Throat:      Mouth: Mucous membranes are moist. No oral lesions.   Eyes:      Extraocular Movements: Extraocular movements intact.      Pupils: Pupils are equal, round, and reactive to light.   Cardiovascular:      Rate and Rhythm: Normal rate and regular rhythm.      Pulses: Normal pulses.      Heart sounds: Normal heart sounds. No murmur heard.  Pulmonary:      Effort: Pulmonary effort is normal.      Breath sounds: Normal breath sounds. No wheezing, rhonchi or rales.   Musculoskeletal:      Right lower leg: No edema.      Left lower leg: No edema.   Skin:     General: Skin is warm and dry.      Findings: No lesion or rash.   Neurological:      General: No focal deficit present.      Mental Status: She is alert and oriented to person, place, and time.   Psychiatric:         Mood and Affect: Affect normal. Mood is not anxious or depressed.         Vital Signs:   /68 (BP Location: Right arm, Patient Position: Sitting, Cuff Size: Adult)   Pulse 63   Temp 97.6 °F (36.4 °C) (Oral)   Resp 16   Ht 167.6 cm (66\")   Wt 64.9 kg (143 lb)   SpO2 95% Comment: RA; 2 L's PRN  BMI 23.08 kg/m²        Result Review :   The following data was reviewed by: BRYANT Rendon on 07/31/2024:  CMP          10/9/2023    05:00 10/16/2023    04:00 12/6/2023    17:11   CMP   Glucose 108  98 "  96    BUN 22  10  14    Creatinine 0.57  0.45  0.63    EGFR 88.1  93.2  85.4    Sodium 134  139  139    Potassium 3.9  4.0  4.0    Chloride 98  103  102    Calcium 9.3  8.8  9.1    Total Protein 6.1  5.7  6.7    Albumin 3.1  3.1  4.2    Globulin 3.0  2.6  2.5    Total Bilirubin 0.7  0.4  0.4    Alkaline Phosphatase 88  73  75    AST (SGOT) 21  19  12    ALT (SGPT) 13  20  7    Albumin/Globulin Ratio 1.0  1.2  1.7    BUN/Creatinine Ratio 38.6  22.2  22.2    Anion Gap 9.4  8.7  11.5      CBC w/diff          10/9/2023    05:00 10/16/2023    04:00 12/6/2023    17:11   CBC w/Diff   WBC 13.01  9.40  14.29    RBC 3.64  3.30  3.90    Hemoglobin 10.6  9.5  11.1    Hematocrit 33.4  31.0  34.5    MCV 91.8  93.9  88.5    MCH 29.1  28.8  28.5    MCHC 31.7  30.6  32.2    RDW 14.9  14.5  14.6    Platelets 119  187  138    Neutrophil Rel %  51.8  61.8    Immature Granulocyte Rel %  2.8  0.3    Lymphocyte Rel %  13.9  7.2    Monocyte Rel %  30.4  30.4    Eosinophil Rel %  0.7  0.1    Basophil Rel %  0.4  0.2      Data reviewed : Radiologic studies chest CT 12/6/2023, pulmonary function test 1/18/2023 and my last office note    Procedures        Assessment and Plan    Diagnoses and all orders for this visit:    1. Asthma-COPD overlap syndrome (Primary)  Comments:  Continue Advair    2. Diastolic dysfunction  Comments:  Follow-up with cardiology as scheduled    3. Dyspnea on exertion  Comments:  Stable          Follow Up   Return in about 6 months (around 1/31/2025) for Recheck with Katlyn.  Patient was given instructions and counseling regarding her condition or for health maintenance advice. Please see specific information pulled into the AVS if appropriate.

## 2024-07-31 ENCOUNTER — OFFICE VISIT (OUTPATIENT)
Dept: PULMONOLOGY | Facility: CLINIC | Age: 89
End: 2024-07-31
Payer: MEDICARE

## 2024-07-31 VITALS
RESPIRATION RATE: 16 BRPM | HEART RATE: 63 BPM | OXYGEN SATURATION: 95 % | HEIGHT: 66 IN | WEIGHT: 143 LBS | DIASTOLIC BLOOD PRESSURE: 68 MMHG | SYSTOLIC BLOOD PRESSURE: 179 MMHG | BODY MASS INDEX: 22.98 KG/M2 | TEMPERATURE: 97.6 F

## 2024-07-31 DIAGNOSIS — R06.09 DYSPNEA ON EXERTION: ICD-10-CM

## 2024-07-31 DIAGNOSIS — I51.89 DIASTOLIC DYSFUNCTION: ICD-10-CM

## 2024-07-31 DIAGNOSIS — J44.89 ASTHMA-COPD OVERLAP SYNDROME: Primary | Chronic | ICD-10-CM

## 2024-07-31 PROCEDURE — 1159F MED LIST DOCD IN RCRD: CPT | Performed by: NURSE PRACTITIONER

## 2024-07-31 PROCEDURE — 99213 OFFICE O/P EST LOW 20 MIN: CPT | Performed by: NURSE PRACTITIONER

## 2024-07-31 PROCEDURE — 1160F RVW MEDS BY RX/DR IN RCRD: CPT | Performed by: NURSE PRACTITIONER

## 2024-07-31 RX ORDER — FLUTICASONE PROPIONATE AND SALMETEROL XINAFOATE 115; 21 UG/1; UG/1
2 AEROSOL, METERED RESPIRATORY (INHALATION)
COMMUNITY

## 2025-02-06 ENCOUNTER — OFFICE VISIT (OUTPATIENT)
Dept: PULMONOLOGY | Facility: CLINIC | Age: OVER 89
End: 2025-02-06
Payer: MEDICARE

## 2025-02-06 VITALS
HEIGHT: 66 IN | TEMPERATURE: 97.6 F | RESPIRATION RATE: 14 BRPM | BODY MASS INDEX: 22.98 KG/M2 | HEART RATE: 75 BPM | DIASTOLIC BLOOD PRESSURE: 83 MMHG | SYSTOLIC BLOOD PRESSURE: 171 MMHG | WEIGHT: 143 LBS

## 2025-02-06 DIAGNOSIS — I51.89 DIASTOLIC DYSFUNCTION: ICD-10-CM

## 2025-02-06 DIAGNOSIS — J44.89 ASTHMA-COPD OVERLAP SYNDROME: Primary | ICD-10-CM

## 2025-02-06 DIAGNOSIS — J96.11 CHRONIC RESPIRATORY FAILURE WITH HYPOXIA: ICD-10-CM

## 2025-02-06 DIAGNOSIS — R06.09 DYSPNEA ON EXERTION: ICD-10-CM

## 2025-02-06 RX ORDER — ALBUTEROL SULFATE 90 UG/1
2 INHALANT RESPIRATORY (INHALATION) EVERY 4 HOURS PRN
Qty: 18 G | Refills: 11 | Status: SHIPPED | OUTPATIENT
Start: 2025-02-06

## 2025-02-06 RX ORDER — ARFORMOTEROL TARTRATE 15 UG/2ML
15 SOLUTION RESPIRATORY (INHALATION)
Start: 2025-02-06

## 2025-02-06 RX ORDER — BUDESONIDE 0.5 MG/2ML
0.5 INHALANT ORAL 2 TIMES DAILY
Start: 2025-02-06

## 2025-06-27 ENCOUNTER — OFFICE VISIT (OUTPATIENT)
Dept: CARDIOLOGY | Facility: CLINIC | Age: OVER 89
End: 2025-06-27
Payer: MEDICARE

## 2025-06-27 VITALS — DIASTOLIC BLOOD PRESSURE: 68 MMHG | HEART RATE: 57 BPM | SYSTOLIC BLOOD PRESSURE: 160 MMHG

## 2025-06-27 DIAGNOSIS — I82.4Z9 DEEP VEIN THROMBOSIS (DVT) OF DISTAL VEIN OF LOWER EXTREMITY, UNSPECIFIED CHRONICITY, UNSPECIFIED LATERALITY: ICD-10-CM

## 2025-06-27 DIAGNOSIS — R09.89 LABILE HYPERTENSION: Primary | ICD-10-CM

## 2025-06-27 DIAGNOSIS — I51.89 DIASTOLIC DYSFUNCTION: ICD-10-CM

## 2025-06-27 PROCEDURE — 99214 OFFICE O/P EST MOD 30 MIN: CPT | Performed by: INTERNAL MEDICINE

## 2025-06-27 PROCEDURE — 1159F MED LIST DOCD IN RCRD: CPT | Performed by: INTERNAL MEDICINE

## 2025-06-27 PROCEDURE — 1160F RVW MEDS BY RX/DR IN RCRD: CPT | Performed by: INTERNAL MEDICINE

## 2025-06-27 NOTE — PROGRESS NOTES
Chief Complaint  Follow-up    Subjective            Jeanne Dallas presents to [facility] CARDIOLOGY  History of Present Illness    Jeanne is here for follow-up evaluation management of labile hypertension.  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  Overall she is doing relatively well.  She denies chest pain, palpitations or excessive shortness of breath.  She is compliant with her medical therapy.    PMH  Past Medical History:   Diagnosis Date    Asthma     Congenital heart disease     Disease of thyroid gland     Hypertension          SURGICALHX  Past Surgical History:   Procedure Laterality Date    CARDIAC CATHETERIZATION      HIP HEMIARTHROPLASTY Right 10/5/2023    Procedure: HIP HEMIARTHROPLASTY ANTERIOR;  Surgeon: Emmett Ambrose MD;  Location: [facility] MAIN OR;  Service: Orthopedics;  Laterality: Right;        SOC  Social History     Socioeconomic History    Marital status:    Tobacco Use    Smoking status: Never     Passive exposure: Past    Smokeless tobacco: Never   Vaping Use    Vaping status: Never Used   Substance and Sexual Activity    Alcohol use: Never    Drug use: Never    Sexual activity: Defer         FAMHX  Family History   Problem Relation Age of Onset    Heart attack Mother     Heart failure Mother     Stroke Father           ALLERGY  No Known Allergies     MEDSCURRENT    Current Outpatient Medications:     albuterol sulfate  (90 Base) MCG/ACT inhaler, Inhale 2 puffs Every 4 (Four) Hours As Needed for Wheezing., Disp: 18 g, Rfl: 11    apixaban (Eliquis) 5 MG tablet tablet, take 2 tablets by mouth 2 times a day for 7 days and then take 1 tablet by mouth 2 times a day, Disp: 74 tablet, Rfl: 0    arformoterol (Brovana) 15 MCG/2ML nebulizer solution, Take 2 mL by nebulization 2 (Two) Times a Day., Disp: , Rfl:     aspirin 81 MG EC tablet, Take 1 tablet by mouth Daily., Disp: , Rfl:     budesonide (Pulmicort) 0.5 MG/2ML nebulizer solution, Take 2 mL by  nebulization 2 (Two) Times a Day., Disp: , Rfl:     estradiol (ESTRACE) 0.1 MG/GM vaginal cream, Insert 1 g into the vagina 3 (Three) Times a Day. Apply cream to vagina area 2-3 times per week, Disp: , Rfl:     lisinopril (PRINIVIL,ZESTRIL) 10 MG tablet, Take 1 tablet by mouth 2 (Two) Times a Day., Disp: 180 tablet, Rfl: 3    metoprolol tartrate (LOPRESSOR) 25 MG tablet, Take 0.5 tablets by mouth 2 (Two) Times a Day., Disp: , Rfl:     omeprazole (priLOSEC) 20 MG capsule, Take 1 capsule by mouth Daily., Disp: , Rfl:     spironolactone (ALDACTONE) 25 MG tablet, Take 0.5 tablets by mouth Every Morning., Disp: , Rfl:     Vibegron 75 MG tablet, Take 1 tablet by mouth Daily., Disp: , Rfl:     docusate sodium 100 MG capsule, Take 1 capsule by mouth 2 (Two) Times a Day As Needed for Constipation. (Patient not taking: Reported on 2/6/2025), Disp: , Rfl:     Jardiance 10 MG tablet tablet, Take 1 tablet by mouth Daily. (Patient not taking: Reported on 7/2/2024), Disp: , Rfl:     Multiple Vitamins-Minerals (PRESERVISION AREDS 2 PO), Take  by mouth Daily., Disp: , Rfl:       Review of Systems   Cardiovascular:  Negative for chest pain, dyspnea on exertion and palpitations.        Objective     /68   Pulse 57       General Appearance:   well developed  well nourished  HENT:   oropharynx moist  lips not cyanotic  Neck:  thyroid not enlarged  supple  Respiratory:  no respiratory distress  normal breath sounds  no rales  Cardiovascular:  no jugular venous distention  regular rhythm  apical impulse normal  S1 normal, S2 normal  no S3, no S4   no murmur  no rub, no thrill  carotid pulses normal; no bruit  pedal pulses normal  lower extremity edema: Trace  Musculoskeletal:  no clubbing of fingers.   normocephalic, head atraumatic  Skin:   warm, dry  Psychiatric:  judgement and insight appropriate  normal mood and affect      Result Review :     The following data was reviewed by: Landon Yee MD on  07/02/2024:                  Data reviewed : Primary care visits reviewed, laboratory studies reviewed Last echocardiogram showed normal LV systolic function with diastolic dysfunction and mild mitral regurgitation.    Procedures         Assessment and Plan        ASSESSMENT:  Encounter Diagnoses   Name Primary?    Labile hypertension Yes    Diastolic dysfunction     Deep vein thrombosis (DVT) of distal vein of lower extremity, unspecified chronicity, unspecified laterality          PLAN:    1.  Labile hypertension-Home readings are well-controlled, continue current medical therapy  2.  Chronic diastolic dysfunction-volume status is stable.  Continue current medical therapy.  3.  Previous DVT on Eliquis, continue.    Follow-up annually unless problems arise      Patient was given instructions and counseling regarding her condition or for health maintenance advice. Please see specific information pulled into the AVS if appropriate.           Landon Yee MD   6/27/2025  11:01 EDT

## 2025-08-12 ENCOUNTER — OFFICE VISIT (OUTPATIENT)
Dept: PULMONOLOGY | Facility: CLINIC | Age: OVER 89
End: 2025-08-12
Payer: MEDICARE

## 2025-08-12 VITALS
TEMPERATURE: 97.6 F | HEART RATE: 102 BPM | BODY MASS INDEX: 22.02 KG/M2 | RESPIRATION RATE: 16 BRPM | WEIGHT: 137 LBS | HEIGHT: 66 IN | OXYGEN SATURATION: 94 % | SYSTOLIC BLOOD PRESSURE: 178 MMHG | DIASTOLIC BLOOD PRESSURE: 84 MMHG

## 2025-08-12 DIAGNOSIS — J96.11 CHRONIC RESPIRATORY FAILURE WITH HYPOXIA: Chronic | ICD-10-CM

## 2025-08-12 DIAGNOSIS — R06.09 DYSPNEA ON EXERTION: ICD-10-CM

## 2025-08-12 DIAGNOSIS — J44.89 ASTHMA-COPD OVERLAP SYNDROME: Primary | Chronic | ICD-10-CM

## 2025-08-12 DIAGNOSIS — I51.89 DIASTOLIC DYSFUNCTION: Chronic | ICD-10-CM

## 2025-08-12 RX ORDER — FLUTICASONE PROPIONATE AND SALMETEROL 100; 50 UG/1; UG/1
1 POWDER RESPIRATORY (INHALATION)
Qty: 60 EACH | Refills: 11 | Status: SHIPPED | OUTPATIENT
Start: 2025-08-12

## 2025-08-12 RX ORDER — FLUTICASONE PROPIONATE AND SALMETEROL 100; 50 UG/1; UG/1
POWDER RESPIRATORY (INHALATION)
COMMUNITY
Start: 2025-07-18 | End: 2025-08-12 | Stop reason: SDUPTHER

## 2025-08-12 RX ORDER — AMLODIPINE BESYLATE 5 MG/1
5 TABLET ORAL EVERY MORNING
COMMUNITY
Start: 2025-07-29

## (undated) DEVICE — PENCL E/S SMOKEEVAC W/TELESCP CANN

## (undated) DEVICE — GLV SURG SENSICARE PI ORTHO SZ8 LF STRL

## (undated) DEVICE — PROXIMATE RH ROTATING HEAD SKIN STAPLERS (35 WIDE) CONTAINS 35 STAINLESS STEEL STAPLES: Brand: PROXIMATE

## (undated) DEVICE — UNDYED BRAIDED (POLYGLACTIN 910), SYNTHETIC ABSORBABLE SUTURE: Brand: COATED VICRYL

## (undated) DEVICE — DRP SURG U/DRP INVISISHIELD PA 48X52IN

## (undated) DEVICE — TOTAL ANTERIOR HIP-LF: Brand: MEDLINE INDUSTRIES, INC.

## (undated) DEVICE — ELECTRD BLD STD SS 3/32X6.5IN

## (undated) DEVICE — PULLOVER TOGA, 2X LARGE: Brand: FLYTE, SURGICOOL

## (undated) DEVICE — GLV SURG SENSICARE SLT PF LF 7.5 STRL

## (undated) DEVICE — KT PT POSITION SUPINE HANA/PROFX TABL

## (undated) DEVICE — 450 ML BOTTLE OF 0.05% CHLORHEXIDINE GLUCONATE IN 99.95% STERILE WATER FOR IRRIGATION, USP AND APPLICATOR.: Brand: IRRISEPT ANTIMICROBIAL WOUND LAVAGE

## (undated) DEVICE — SYS MIX CLEARMIX SGL/DBL VAC

## (undated) DEVICE — SLV SCD KN/LEN ADJ EXPRSS BLENDED MD 1P/U

## (undated) DEVICE — STRYKER PERFORMANCE SERIES SAGITTAL BLADE: Brand: STRYKER PERFORMANCE SERIES

## (undated) DEVICE — TOWEL,OR,DSP,ST,BLUE,STD,4/PK,20PK/CS: Brand: MEDLINE

## (undated) DEVICE — Device: Brand: COVER, PERINEAL POST, 12 PK

## (undated) DEVICE — APPL CHLORAPREP HI/LITE 26ML ORNG

## (undated) DEVICE — ANTIBACTERIAL VIOLET BRAIDED (POLYGLACTIN 910), SYNTHETIC ABSORBABLE SURGICAL SUTURE: Brand: COATED VICRYL

## (undated) DEVICE — BIPOLAR SEALER 23-112-1 AQM 6.0: Brand: AQUAMANTYS™

## (undated) DEVICE — SUCTION MAT (LOW PROFILE), 50X34: Brand: NEPTUNE

## (undated) DEVICE — ELECTRD BLD EXT EDGE 1P COAT 6.5IN STRL

## (undated) DEVICE — ELECTRD BLD EDGE COAT 3IN

## (undated) DEVICE — SOL IRR NACL 0.9PCT BT 1000ML

## (undated) DEVICE — Device: Brand: PULSAVAC®

## (undated) DEVICE — MEDI-VAC NON-CONDUCTIVE SUCTION TUBING: Brand: CARDINAL HEALTH

## (undated) DEVICE — SOL IRR NACL 0.9PCT 3000ML

## (undated) DEVICE — CVR LEG BOOTLEG F/R NOSKID 33IN

## (undated) DEVICE — GAUZE,SPONGE,4"X4",16PLY,STRL,LF,10/TRAY: Brand: MEDLINE